# Patient Record
Sex: MALE | Race: WHITE | NOT HISPANIC OR LATINO | Employment: UNEMPLOYED | URBAN - METROPOLITAN AREA
[De-identification: names, ages, dates, MRNs, and addresses within clinical notes are randomized per-mention and may not be internally consistent; named-entity substitution may affect disease eponyms.]

---

## 2019-04-06 ENCOUNTER — HOSPITAL ENCOUNTER (INPATIENT)
Facility: HOSPITAL | Age: 69
LOS: 3 days | Discharge: HOME | DRG: 190 | End: 2019-04-09
Attending: EMERGENCY MEDICINE | Admitting: EMERGENCY MEDICINE
Payer: MEDICARE

## 2019-04-06 DIAGNOSIS — J96.01 ACUTE RESPIRATORY FAILURE WITH HYPOXIA AND HYPERCAPNIA: ICD-10-CM

## 2019-04-06 DIAGNOSIS — J96.02 ACUTE RESPIRATORY FAILURE WITH HYPOXIA AND HYPERCAPNIA: ICD-10-CM

## 2019-04-06 DIAGNOSIS — J44.1 COPD WITH ACUTE EXACERBATION: Primary | ICD-10-CM

## 2019-04-06 DIAGNOSIS — R06.02 SOB (SHORTNESS OF BREATH): ICD-10-CM

## 2019-04-06 PROCEDURE — 99291 CRITICAL CARE FIRST HOUR: CPT | Mod: 25

## 2019-04-06 PROCEDURE — 93010 EKG 12-LEAD: ICD-10-PCS | Mod: ,,, | Performed by: INTERNAL MEDICINE

## 2019-04-06 PROCEDURE — 12000002 HC ACUTE/MED SURGE SEMI-PRIVATE ROOM

## 2019-04-06 PROCEDURE — 93005 ELECTROCARDIOGRAM TRACING: CPT

## 2019-04-06 PROCEDURE — 93010 ELECTROCARDIOGRAM REPORT: CPT | Mod: ,,, | Performed by: INTERNAL MEDICINE

## 2019-04-06 RX ORDER — LISINOPRIL 40 MG/1
40 TABLET ORAL 2 TIMES DAILY
Status: ON HOLD | COMMUNITY
End: 2019-04-09 | Stop reason: HOSPADM

## 2019-04-06 RX ORDER — ALBUTEROL SULFATE 90 UG/1
2 AEROSOL, METERED RESPIRATORY (INHALATION) EVERY 6 HOURS PRN
COMMUNITY

## 2019-04-06 RX ORDER — ALBUTEROL SULFATE 90 UG/1
2 AEROSOL, METERED RESPIRATORY (INHALATION) EVERY 6 HOURS PRN
Status: ON HOLD | COMMUNITY
End: 2019-04-09 | Stop reason: HOSPADM

## 2019-04-06 RX ORDER — MELOXICAM 7.5 MG/1
7.5 TABLET ORAL 2 TIMES DAILY PRN
Status: ON HOLD | COMMUNITY
End: 2019-05-04 | Stop reason: HOSPADM

## 2019-04-06 RX ORDER — AMLODIPINE BESYLATE 10 MG/1
10 TABLET ORAL DAILY
Status: ON HOLD | COMMUNITY
End: 2019-04-09 | Stop reason: HOSPADM

## 2019-04-07 PROBLEM — J96.02 ACUTE RESPIRATORY FAILURE WITH HYPOXIA AND HYPERCAPNIA: Status: ACTIVE | Noted: 2019-04-07

## 2019-04-07 PROBLEM — J44.1 COPD WITH ACUTE EXACERBATION: Status: ACTIVE | Noted: 2019-04-07

## 2019-04-07 PROBLEM — Z72.0 TOBACCO ABUSE: Chronic | Status: ACTIVE | Noted: 2019-04-07

## 2019-04-07 PROBLEM — B18.2 CHRONIC HEPATITIS C WITHOUT HEPATIC COMA: Chronic | Status: ACTIVE | Noted: 2019-04-07

## 2019-04-07 PROBLEM — I10 ESSENTIAL HYPERTENSION: Chronic | Status: ACTIVE | Noted: 2019-04-07

## 2019-04-07 PROBLEM — J96.01 ACUTE RESPIRATORY FAILURE WITH HYPOXIA AND HYPERCAPNIA: Status: ACTIVE | Noted: 2019-04-07

## 2019-04-07 LAB
ALBUMIN SERPL BCP-MCNC: 3.9 G/DL (ref 3.5–5.2)
ALBUMIN SERPL BCP-MCNC: 4.4 G/DL (ref 3.5–5.2)
ALLENS TEST: ABNORMAL
ALP SERPL-CCNC: 73 U/L (ref 55–135)
ALP SERPL-CCNC: 79 U/L (ref 55–135)
ALT SERPL W/O P-5'-P-CCNC: 35 U/L (ref 10–44)
ALT SERPL W/O P-5'-P-CCNC: 36 U/L (ref 10–44)
ANION GAP SERPL CALC-SCNC: 8 MMOL/L (ref 8–16)
ANION GAP SERPL CALC-SCNC: 9 MMOL/L (ref 8–16)
AST SERPL-CCNC: 31 U/L (ref 10–40)
AST SERPL-CCNC: 34 U/L (ref 10–40)
BASOPHILS # BLD AUTO: 0.05 K/UL (ref 0–0.2)
BASOPHILS # BLD AUTO: 0.16 K/UL (ref 0–0.2)
BASOPHILS NFR BLD: 0.8 % (ref 0–1.9)
BASOPHILS NFR BLD: 1.6 % (ref 0–1.9)
BILIRUB SERPL-MCNC: 0.4 MG/DL (ref 0.1–1)
BILIRUB SERPL-MCNC: 0.5 MG/DL (ref 0.1–1)
BNP SERPL-MCNC: 33 PG/ML (ref 0–99)
BUN SERPL-MCNC: 18 MG/DL (ref 8–23)
BUN SERPL-MCNC: 18 MG/DL (ref 8–23)
CALCIUM SERPL-MCNC: 9.5 MG/DL (ref 8.7–10.5)
CALCIUM SERPL-MCNC: 9.7 MG/DL (ref 8.7–10.5)
CHLORIDE SERPL-SCNC: 102 MMOL/L (ref 95–110)
CHLORIDE SERPL-SCNC: 104 MMOL/L (ref 95–110)
CO2 SERPL-SCNC: 28 MMOL/L (ref 23–29)
CO2 SERPL-SCNC: 30 MMOL/L (ref 23–29)
CREAT SERPL-MCNC: 0.8 MG/DL (ref 0.5–1.4)
CREAT SERPL-MCNC: 0.9 MG/DL (ref 0.5–1.4)
DELSYS: ABNORMAL
DIFFERENTIAL METHOD: ABNORMAL
DIFFERENTIAL METHOD: ABNORMAL
EOSINOPHIL # BLD AUTO: 0.1 K/UL (ref 0–0.5)
EOSINOPHIL # BLD AUTO: 1.3 K/UL (ref 0–0.5)
EOSINOPHIL NFR BLD: 1.1 % (ref 0–8)
EOSINOPHIL NFR BLD: 12.8 % (ref 0–8)
EP: 5
ERYTHROCYTE [DISTWIDTH] IN BLOOD BY AUTOMATED COUNT: 13.5 % (ref 11.5–14.5)
ERYTHROCYTE [DISTWIDTH] IN BLOOD BY AUTOMATED COUNT: 13.6 % (ref 11.5–14.5)
ERYTHROCYTE [SEDIMENTATION RATE] IN BLOOD BY WESTERGREN METHOD: 19 MM/H
EST. GFR  (AFRICAN AMERICAN): >60 ML/MIN/1.73 M^2
EST. GFR  (AFRICAN AMERICAN): >60 ML/MIN/1.73 M^2
EST. GFR  (NON AFRICAN AMERICAN): >60 ML/MIN/1.73 M^2
EST. GFR  (NON AFRICAN AMERICAN): >60 ML/MIN/1.73 M^2
FIO2: 40
GLUCOSE SERPL-MCNC: 148 MG/DL (ref 70–110)
GLUCOSE SERPL-MCNC: 157 MG/DL (ref 70–110)
HCO3 UR-SCNC: 28.1 MMOL/L (ref 24–28)
HCT VFR BLD AUTO: 42.4 % (ref 40–54)
HCT VFR BLD AUTO: 44.6 % (ref 40–54)
HGB BLD-MCNC: 14.2 G/DL (ref 14–18)
HGB BLD-MCNC: 14.9 G/DL (ref 14–18)
IP: 10
LYMPHOCYTES # BLD AUTO: 0.6 K/UL (ref 1–4.8)
LYMPHOCYTES # BLD AUTO: 3 K/UL (ref 1–4.8)
LYMPHOCYTES NFR BLD: 30.4 % (ref 18–48)
LYMPHOCYTES NFR BLD: 9.3 % (ref 18–48)
MAGNESIUM SERPL-MCNC: 2.1 MG/DL (ref 1.6–2.6)
MAGNESIUM SERPL-MCNC: 2.1 MG/DL (ref 1.6–2.6)
MCH RBC QN AUTO: 30.4 PG (ref 27–31)
MCH RBC QN AUTO: 31.2 PG (ref 27–31)
MCHC RBC AUTO-ENTMCNC: 33.4 G/DL (ref 32–36)
MCHC RBC AUTO-ENTMCNC: 33.5 G/DL (ref 32–36)
MCV RBC AUTO: 91 FL (ref 82–98)
MCV RBC AUTO: 94 FL (ref 82–98)
MIN VOL: 8.6
MODE: ABNORMAL
MONOCYTES # BLD AUTO: 0.1 K/UL (ref 0.3–1)
MONOCYTES # BLD AUTO: 0.9 K/UL (ref 0.3–1)
MONOCYTES NFR BLD: 1.4 % (ref 4–15)
MONOCYTES NFR BLD: 8.8 % (ref 4–15)
NEUTROPHILS # BLD AUTO: 4.6 K/UL (ref 1.8–7.7)
NEUTROPHILS # BLD AUTO: 5.4 K/UL (ref 1.8–7.7)
NEUTROPHILS NFR BLD: 46.4 % (ref 38–73)
NEUTROPHILS NFR BLD: 87.1 % (ref 38–73)
PCO2 BLDA: 57.3 MMHG (ref 35–45)
PH SMN: 7.3 [PH] (ref 7.35–7.45)
PHOSPHATE SERPL-MCNC: 3.1 MG/DL (ref 2.7–4.5)
PLATELET # BLD AUTO: 186 K/UL (ref 150–350)
PLATELET # BLD AUTO: 208 K/UL (ref 150–350)
PMV BLD AUTO: 11.2 FL (ref 9.2–12.9)
PMV BLD AUTO: 11.7 FL (ref 9.2–12.9)
PO2 BLDA: 105 MMHG (ref 80–100)
POC BE: 0 MMOL/L
POC SATURATED O2: 97 % (ref 95–100)
POC TCO2: 30 MMOL/L (ref 23–27)
POTASSIUM SERPL-SCNC: 3.8 MMOL/L (ref 3.5–5.1)
POTASSIUM SERPL-SCNC: 3.9 MMOL/L (ref 3.5–5.1)
PROT SERPL-MCNC: 7 G/DL (ref 6–8.4)
PROT SERPL-MCNC: 7.3 G/DL (ref 6–8.4)
RBC # BLD AUTO: 4.67 M/UL (ref 4.6–6.2)
RBC # BLD AUTO: 4.77 M/UL (ref 4.6–6.2)
SAMPLE: ABNORMAL
SITE: ABNORMAL
SODIUM SERPL-SCNC: 139 MMOL/L (ref 136–145)
SODIUM SERPL-SCNC: 142 MMOL/L (ref 136–145)
SP02: 100
TROPONIN I SERPL DL<=0.01 NG/ML-MCNC: 0.01 NG/ML (ref 0–0.03)
WBC # BLD AUTO: 6.21 K/UL (ref 3.9–12.7)
WBC # BLD AUTO: 9.82 K/UL (ref 3.9–12.7)

## 2019-04-07 PROCEDURE — 83735 ASSAY OF MAGNESIUM: CPT

## 2019-04-07 PROCEDURE — 84484 ASSAY OF TROPONIN QUANT: CPT

## 2019-04-07 PROCEDURE — 25000003 PHARM REV CODE 250: Performed by: INTERNAL MEDICINE

## 2019-04-07 PROCEDURE — 25000003 PHARM REV CODE 250: Performed by: EMERGENCY MEDICINE

## 2019-04-07 PROCEDURE — 36415 COLL VENOUS BLD VENIPUNCTURE: CPT

## 2019-04-07 PROCEDURE — 84100 ASSAY OF PHOSPHORUS: CPT

## 2019-04-07 PROCEDURE — 94761 N-INVAS EAR/PLS OXIMETRY MLT: CPT

## 2019-04-07 PROCEDURE — G0009 ADMIN PNEUMOCOCCAL VACCINE: HCPCS | Performed by: INTERNAL MEDICINE

## 2019-04-07 PROCEDURE — 36600 WITHDRAWAL OF ARTERIAL BLOOD: CPT

## 2019-04-07 PROCEDURE — 83735 ASSAY OF MAGNESIUM: CPT | Mod: 91

## 2019-04-07 PROCEDURE — 99900035 HC TECH TIME PER 15 MIN (STAT)

## 2019-04-07 PROCEDURE — 82803 BLOOD GASES ANY COMBINATION: CPT

## 2019-04-07 PROCEDURE — 94640 AIRWAY INHALATION TREATMENT: CPT

## 2019-04-07 PROCEDURE — 27000221 HC OXYGEN, UP TO 24 HOURS

## 2019-04-07 PROCEDURE — 11000001 HC ACUTE MED/SURG PRIVATE ROOM

## 2019-04-07 PROCEDURE — 63600175 PHARM REV CODE 636 W HCPCS: Performed by: INTERNAL MEDICINE

## 2019-04-07 PROCEDURE — 25000242 PHARM REV CODE 250 ALT 637 W/ HCPCS: Performed by: EMERGENCY MEDICINE

## 2019-04-07 PROCEDURE — 90670 PCV13 VACCINE IM: CPT | Performed by: INTERNAL MEDICINE

## 2019-04-07 PROCEDURE — 85025 COMPLETE CBC W/AUTO DIFF WBC: CPT | Mod: 91

## 2019-04-07 PROCEDURE — 94660 CPAP INITIATION&MGMT: CPT

## 2019-04-07 PROCEDURE — 27000190 HC CPAP FULL FACE MASK W/VALVE

## 2019-04-07 PROCEDURE — 90471 IMMUNIZATION ADMIN: CPT | Performed by: INTERNAL MEDICINE

## 2019-04-07 PROCEDURE — 80053 COMPREHEN METABOLIC PANEL: CPT | Mod: 91

## 2019-04-07 PROCEDURE — 25000242 PHARM REV CODE 250 ALT 637 W/ HCPCS: Performed by: INTERNAL MEDICINE

## 2019-04-07 PROCEDURE — 94644 CONT INHLJ TX 1ST HOUR: CPT

## 2019-04-07 PROCEDURE — S4991 NICOTINE PATCH NONLEGEND: HCPCS | Performed by: INTERNAL MEDICINE

## 2019-04-07 PROCEDURE — 80053 COMPREHEN METABOLIC PANEL: CPT

## 2019-04-07 PROCEDURE — 83880 ASSAY OF NATRIURETIC PEPTIDE: CPT

## 2019-04-07 RX ORDER — FAMOTIDINE 20 MG/1
20 TABLET, FILM COATED ORAL 2 TIMES DAILY
Status: CANCELLED | OUTPATIENT
Start: 2019-04-07

## 2019-04-07 RX ORDER — AMOXICILLIN 250 MG
1 CAPSULE ORAL 2 TIMES DAILY PRN
Status: DISCONTINUED | OUTPATIENT
Start: 2019-04-07 | End: 2019-04-09 | Stop reason: HOSPADM

## 2019-04-07 RX ORDER — IPRATROPIUM BROMIDE AND ALBUTEROL SULFATE 2.5; .5 MG/3ML; MG/3ML
3 SOLUTION RESPIRATORY (INHALATION)
Status: DISCONTINUED | OUTPATIENT
Start: 2019-04-07 | End: 2019-04-09 | Stop reason: HOSPADM

## 2019-04-07 RX ORDER — DOXYCYCLINE HYCLATE 100 MG
100 TABLET ORAL EVERY 12 HOURS
Status: DISCONTINUED | OUTPATIENT
Start: 2019-04-07 | End: 2019-04-09 | Stop reason: HOSPADM

## 2019-04-07 RX ORDER — GUAIFENESIN 600 MG/1
600 TABLET, EXTENDED RELEASE ORAL 2 TIMES DAILY
Status: DISCONTINUED | OUTPATIENT
Start: 2019-04-07 | End: 2019-04-09 | Stop reason: HOSPADM

## 2019-04-07 RX ORDER — LISINOPRIL 20 MG/1
40 TABLET ORAL 2 TIMES DAILY
Status: DISCONTINUED | OUTPATIENT
Start: 2019-04-07 | End: 2019-04-09 | Stop reason: HOSPADM

## 2019-04-07 RX ORDER — ACETAMINOPHEN 500 MG
500 TABLET ORAL EVERY 6 HOURS PRN
Status: DISCONTINUED | OUTPATIENT
Start: 2019-04-07 | End: 2019-04-09 | Stop reason: HOSPADM

## 2019-04-07 RX ORDER — ENOXAPARIN SODIUM 100 MG/ML
40 INJECTION SUBCUTANEOUS EVERY 24 HOURS
Status: DISCONTINUED | OUTPATIENT
Start: 2019-04-07 | End: 2019-04-09 | Stop reason: HOSPADM

## 2019-04-07 RX ORDER — PANTOPRAZOLE SODIUM 40 MG/1
40 TABLET, DELAYED RELEASE ORAL DAILY
Status: DISCONTINUED | OUTPATIENT
Start: 2019-04-07 | End: 2019-04-09 | Stop reason: HOSPADM

## 2019-04-07 RX ORDER — SODIUM CHLORIDE 0.9 % (FLUSH) 0.9 %
10 SYRINGE (ML) INJECTION
Status: CANCELLED | OUTPATIENT
Start: 2019-04-07

## 2019-04-07 RX ORDER — METHYLPREDNISOLONE SOD SUCC 125 MG
80 VIAL (EA) INJECTION EVERY 8 HOURS
Status: DISCONTINUED | OUTPATIENT
Start: 2019-04-07 | End: 2019-04-09

## 2019-04-07 RX ORDER — ACETAMINOPHEN 325 MG/1
650 TABLET ORAL EVERY 6 HOURS PRN
Status: DISCONTINUED | OUTPATIENT
Start: 2019-04-07 | End: 2019-04-07 | Stop reason: SDUPTHER

## 2019-04-07 RX ORDER — CLONIDINE HYDROCHLORIDE 0.1 MG/1
0.1 TABLET ORAL 3 TIMES DAILY PRN
Status: DISCONTINUED | OUTPATIENT
Start: 2019-04-07 | End: 2019-04-09 | Stop reason: HOSPADM

## 2019-04-07 RX ORDER — IPRATROPIUM BROMIDE AND ALBUTEROL SULFATE 2.5; .5 MG/3ML; MG/3ML
3 SOLUTION RESPIRATORY (INHALATION) EVERY 4 HOURS PRN
Status: DISCONTINUED | OUTPATIENT
Start: 2019-04-07 | End: 2019-04-09 | Stop reason: HOSPADM

## 2019-04-07 RX ORDER — DOXYCYCLINE HYCLATE 100 MG
100 TABLET ORAL
Status: COMPLETED | OUTPATIENT
Start: 2019-04-07 | End: 2019-04-07

## 2019-04-07 RX ORDER — RAMELTEON 8 MG/1
8 TABLET ORAL NIGHTLY PRN
Status: DISCONTINUED | OUTPATIENT
Start: 2019-04-07 | End: 2019-04-09 | Stop reason: HOSPADM

## 2019-04-07 RX ORDER — IBUPROFEN 200 MG
1 TABLET ORAL DAILY
Status: DISCONTINUED | OUTPATIENT
Start: 2019-04-07 | End: 2019-04-09 | Stop reason: HOSPADM

## 2019-04-07 RX ORDER — ALBUTEROL SULFATE 2.5 MG/.5ML
10 SOLUTION RESPIRATORY (INHALATION) CONTINUOUS
Status: DISCONTINUED | OUTPATIENT
Start: 2019-04-07 | End: 2019-04-09 | Stop reason: HOSPADM

## 2019-04-07 RX ORDER — ONDANSETRON 2 MG/ML
8 INJECTION INTRAMUSCULAR; INTRAVENOUS EVERY 8 HOURS PRN
Status: DISCONTINUED | OUTPATIENT
Start: 2019-04-07 | End: 2019-04-09 | Stop reason: HOSPADM

## 2019-04-07 RX ORDER — AMLODIPINE BESYLATE 5 MG/1
10 TABLET ORAL DAILY
Status: DISCONTINUED | OUTPATIENT
Start: 2019-04-07 | End: 2019-04-09 | Stop reason: HOSPADM

## 2019-04-07 RX ADMIN — DOXYCYCLINE HYCLATE 100 MG: 100 TABLET, COATED ORAL at 01:04

## 2019-04-07 RX ADMIN — LISINOPRIL 40 MG: 20 TABLET ORAL at 09:04

## 2019-04-07 RX ADMIN — GUAIFENESIN 600 MG: 600 TABLET, EXTENDED RELEASE ORAL at 09:04

## 2019-04-07 RX ADMIN — GUAIFENESIN 600 MG: 600 TABLET, EXTENDED RELEASE ORAL at 08:04

## 2019-04-07 RX ADMIN — METHYLPREDNISOLONE SODIUM SUCCINATE 80 MG: 125 INJECTION, POWDER, FOR SOLUTION INTRAMUSCULAR; INTRAVENOUS at 02:04

## 2019-04-07 RX ADMIN — PNEUMOCOCCAL 13-VALENT CONJUGATE VACCINE 0.5 ML: 2.2; 2.2; 2.2; 2.2; 2.2; 4.4; 2.2; 2.2; 2.2; 2.2; 2.2; 2.2; 2.2 INJECTION, SUSPENSION INTRAMUSCULAR at 09:04

## 2019-04-07 RX ADMIN — METHYLPREDNISOLONE SODIUM SUCCINATE 80 MG: 125 INJECTION, POWDER, FOR SOLUTION INTRAMUSCULAR; INTRAVENOUS at 05:04

## 2019-04-07 RX ADMIN — METHYLPREDNISOLONE SODIUM SUCCINATE 80 MG: 125 INJECTION, POWDER, FOR SOLUTION INTRAMUSCULAR; INTRAVENOUS at 08:04

## 2019-04-07 RX ADMIN — DOXYCYCLINE HYCLATE 100 MG: 100 TABLET, COATED ORAL at 09:04

## 2019-04-07 RX ADMIN — IPRATROPIUM BROMIDE AND ALBUTEROL SULFATE 3 ML: .5; 3 SOLUTION RESPIRATORY (INHALATION) at 11:04

## 2019-04-07 RX ADMIN — LISINOPRIL 40 MG: 20 TABLET ORAL at 08:04

## 2019-04-07 RX ADMIN — IPRATROPIUM BROMIDE AND ALBUTEROL SULFATE 3 ML: .5; 3 SOLUTION RESPIRATORY (INHALATION) at 03:04

## 2019-04-07 RX ADMIN — IPRATROPIUM BROMIDE AND ALBUTEROL SULFATE 3 ML: .5; 3 SOLUTION RESPIRATORY (INHALATION) at 08:04

## 2019-04-07 RX ADMIN — RAMELTEON 8 MG: 8 TABLET, FILM COATED ORAL at 08:04

## 2019-04-07 RX ADMIN — ALBUTEROL SULFATE 10 MG: 2.5 SOLUTION RESPIRATORY (INHALATION) at 12:04

## 2019-04-07 RX ADMIN — AMLODIPINE BESYLATE 10 MG: 5 TABLET ORAL at 09:04

## 2019-04-07 RX ADMIN — DOXYCYCLINE HYCLATE 100 MG: 100 TABLET, COATED ORAL at 08:04

## 2019-04-07 RX ADMIN — PANTOPRAZOLE SODIUM 40 MG: 40 TABLET, DELAYED RELEASE ORAL at 09:04

## 2019-04-07 RX ADMIN — NICOTINE 1 PATCH: 21 PATCH, EXTENDED RELEASE TRANSDERMAL at 09:04

## 2019-04-07 RX ADMIN — IPRATROPIUM BROMIDE AND ALBUTEROL SULFATE 3 ML: .5; 3 SOLUTION RESPIRATORY (INHALATION) at 07:04

## 2019-04-07 RX ADMIN — ENOXAPARIN SODIUM 40 MG: 100 INJECTION SUBCUTANEOUS at 05:04

## 2019-04-07 NOTE — NURSING
Patient continued on BIPAP as per MD orders. No respiratory distress noted nor any use of accessory muscles noted at present. Patient denies any discomfort or pain at present. Pt orientated to hospital bed, room, and equipment. Call bell within reach at all times. Bed alarm on at all times. [facility]

## 2019-04-07 NOTE — PLAN OF CARE
Problem: Fall Injury Risk  Goal: Absence of Fall and Fall-Related Injury  Outcome: Ongoing (interventions implemented as appropriate)  Patient remains free from falls and injury. No distress noted. VSS. Questions encouraged and answered. Patient verbalized understanding. Will continue to monitor, will continue with plan of care.   Intervention: Promote Injury-Free Environment     04/07/19 1600   Optimize Balance and Safe Activity   Safety Promotion/Fall Prevention assistive device/personal item within reach;side rails raised x 2;instructed to call staff for mobility         Comments: Patient remains free from falls and injury. No distress noted. VSS. No complaint of pain, n/v, diarrhea, or SOB. Will continue to monitor, will continue with plan of care.

## 2019-04-07 NOTE — PLAN OF CARE
"SW met with patient to complete discharge needs assessment. SW reviewed with patient contents of "[facility]" including "help at home", "things patient responsible for to manage his health at home" and "preferences". Patient was able to verbalize his help at home is Naz Floyd, significant other. SW discussed with patient the things he's responsible for to manage his health at home would be by going on his doctor appointments, taking medications as prescribed, and getting prescriptions filled. SW wrote name and phone number on white communication board. Patient prefers morning appointments.    [facility]     Extended Emergency Contact Information  Primary Emergency Contact: Naz Floyd  Mobile Phone: [phone]  Relation: Significant other     Payor: [employer] MEDICARE / Plan: [employer] / Product Type: Medicare Advantage /     Preferred Pharmacy    Pt self report that Jeanna orders his medication(s) which is mailed to his home.       04/07/19 1335   Discharge Assessment   Assessment Type Discharge Planning Assessment   Confirmed/corrected address and phone number on facesheet? Yes   Assessment information obtained from? Patient   Expected Length of Stay (days) 2   Prior to hospitilization cognitive status: Alert/Oriented   Prior to hospitalization functional status: Independent   Current cognitive status: Alert/Oriented   Current Functional Status: Independent   Facility Arrived From: Ambulance via home   Lives With alone   Able to Return to Prior Arrangements yes   Is patient able to care for self after discharge? Yes   Who are your caregiver(s) and their phone number(s)? Naz Floyd [phone]   Patient's perception of discharge disposition home or selfcare   Readmission Within the Last 30 Days no previous admission in last 30 days   Patient currently being followed by outpatient case management? No   Patient currently receives any other outside agency services? No "   Equipment Currently Used at Home cane, straight   Do you have any problems affording any of your prescribed medications? No   Is the patient taking medications as prescribed? yes   Does the patient have transportation home? Yes   Transportation Anticipated family or friend will provide   Does the patient receive services at the [facility]? No   Discharge Plan A Home   DME Needed Upon Discharge  none   Patient/Family in Agreement with Plan yes        04/07/19 1335   Discharge Assessment   Assessment Type Discharge Planning Assessment   Confirmed/corrected address and phone number on facesheet? Yes   Assessment information obtained from? Patient   Expected Length of Stay (days) 2   Prior to hospitilization cognitive status: Alert/Oriented   Prior to hospitalization functional status: Independent   Current cognitive status: Alert/Oriented   Current Functional Status: Independent   Facility Arrived From: Ambulance via home   Lives With alone   Able to Return to Prior Arrangements yes   Is patient able to care for self after discharge? Yes   Who are your caregiver(s) and their phone number(s)? Naz Floyd [phone]   Patient's perception of discharge disposition home or selfcare   Readmission Within the Last 30 Days no previous admission in last 30 days   Patient currently being followed by outpatient case management? No   Patient currently receives any other outside agency services? No   Equipment Currently Used at Home cane, straight   Do you have any problems affording any of your prescribed medications? No   Is the patient taking medications as prescribed? yes   Does the patient have transportation home? Yes   Transportation Anticipated family or friend will provide   Does the patient receive services at the [facility] Clinic? No   Discharge Plan A Home   DME Needed Upon Discharge  none   Patient/Family in Agreement with Plan yes

## 2019-04-07 NOTE — ASSESSMENT & PLAN NOTE
Patient was noted to have an ambient air oxygen saturation of 80% in the field.  He likely has undiagnosed COPD given his extensive tobacco history.  He required NIPPV with BPAP upon arrival to the ED.  I have personally reviewed his chest radiograph and it was significant for hyperexpanded lung fields but without any infiltrates, consolidations, pleural effusions, nor any pneumothorax.  He did have some relief with nebulized treatments in the ED.  Will provide frequent nebulizations with CHRIS/LAMA; intravenous corticosteroids; and empiric antibiotic therapy.

## 2019-04-07 NOTE — ED PROVIDER NOTES
Encounter Date: 4/6/2019       History     Chief Complaint   Patient presents with    Shortness of Breath     pt states sob x 1 day. ems states o2 sat was 80% on r/a. started recently dx. bronchitis. arrived w/ duo neb in progress and o2 sats 100%     Patient presents with shortness of breath this been building up for 3 days.  He has a productive cough white sputum.  No hemoptysis.  States he has a history of wheezing in the past so she with bronchitis.  He was most recently treated 1 month ago with antibiotics and steroids.  He had some improvement in his symptoms and felt like he never completely got better.  He states that he feels like he is relapse over last 3 days.  His coworkers noticed that he was wheezing today at work.  He became more short of breath tonight.  EMS was called and when they arrived his oxygen saturation was 80% on room air.  He was severely wheezing.  He was given a DuoNeb and Solu-Medrol 125 mg IV.  Oxygen saturations improved on supplemental oxygen.  Patient is still short of breath. He also complains of chest heaviness for 2 days.  This is constant.  He denies any swelling in his legs or calf pain. Denies fever.  Denies abdominal pain.  Denies back pain.  Denies nausea, vomiting, or diarrhea.        Review of patient's allergies indicates:  No Known Allergies  Past Medical History:   Diagnosis Date    Alcohol abuse     Chronic bronchitis     Hepatitis C     Hypertension      History reviewed. No pertinent surgical history.  History reviewed. No pertinent family history.  Social History     Tobacco Use    Smoking status: Current Every Day Smoker     Packs/day: 0.50     Types: Cigarettes    Smokeless tobacco: Never Used   Substance Use Topics    Alcohol use: Not Currently     Comment: used to be a heavy drinker    Drug use: Not Currently     Review of Systems   Constitutional: Negative for chills, diaphoresis and fever.   HENT: Negative for congestion, sinus pain and sore throat.     Eyes: Negative.    Respiratory: Positive for cough, chest tightness, shortness of breath and wheezing.    Cardiovascular: Positive for chest pain. Negative for palpitations and leg swelling.   Gastrointestinal: Negative for abdominal pain, diarrhea, nausea and vomiting.        NO melena or rectal bleeding   Genitourinary: Negative for dysuria and flank pain.   Musculoskeletal: Negative for back pain.   Skin: Negative for rash and wound.   Neurological: Negative for dizziness, syncope, speech difficulty, weakness, numbness and headaches.        No numbness   Psychiatric/Behavioral: Negative for confusion.   All other systems reviewed and are negative.      Physical Exam     Initial Vitals [04/06/19 2349]   BP Pulse Resp Temp SpO2   (!) 180/105 86 (!) 28 97.8 °F (36.6 °C) 100 %      MAP       --         Physical Exam    Nursing note and vitals reviewed.  Constitutional: He appears well-developed and well-nourished. He is not diaphoretic. He appears distressed.   Patient appears uncomfortable due to shortness of breath.   HENT:   Head: Normocephalic and atraumatic.   Nose: Nose normal.   Mouth/Throat: Oropharynx is clear and moist.   Eyes: Conjunctivae and EOM are normal. Pupils are equal, round, and reactive to light. Right eye exhibits no discharge. Left eye exhibits no discharge. No scleral icterus.   Neck: Neck supple. No tracheal deviation present.   Cardiovascular: Normal rate, regular rhythm and normal heart sounds.   No murmur heard.  Pulmonary/Chest: No stridor. He is in respiratory distress. He has wheezes. He has rhonchi. He has no rales. He exhibits no tenderness.   Abdominal: Soft. He exhibits no distension. There is no tenderness. There is no rebound and no guarding.   Musculoskeletal: Normal range of motion. He exhibits no edema or tenderness.   Neurological: He is alert and oriented to person, place, and time. He has normal strength. No cranial nerve deficit. GCS score is 15. GCS eye subscore is 4.  GCS verbal subscore is 5. GCS motor subscore is 6.   Skin: Skin is warm and dry. No rash noted.   Psychiatric: His behavior is normal. Judgment and thought content normal.   Patient is anxious.         ED Course   Critical Care  Date/Time: 4/7/2019 1:43 AM  Performed by: Main Velasquez MD  Authorized by: Main Velasquez MD   Direct patient critical care time: 10 minutes  Additional history critical care time: 10 minutes  Ordering / reviewing critical care time: 10 minutes  Documentation critical care time: 10 minutes  Total critical care time (exclusive of procedural time) : 40 minutes  Critical care was necessary to treat or prevent imminent or life-threatening deterioration of the following conditions: respiratory failure.  Critical care was time spent personally by me on the following activities: re-evaluation of patient's condition, ordering and review of laboratory studies, pulse oximetry, ordering and performing treatments and interventions, evaluation of patient's response to treatment, review of old charts and ordering and review of radiographic studies.        Labs Reviewed   CBC W/ AUTO DIFFERENTIAL   COMPREHENSIVE METABOLIC PANEL   TROPONIN I   MAGNESIUM   B-TYPE NATRIURETIC PEPTIDE     EKG Readings: (Independently Interpreted)   Initial Reading: No STEMI. Rhythm: Normal Sinus Rhythm. Heart Rate: 86. Ectopy: No Ectopy. Conduction: Normal. ST Segments: Normal ST Segments. T Waves: Normal. Axis: Normal. Q Waves: V1 and V2.       Imaging Results    None          Medical Decision Making:   Differential Diagnosis:   COPD, pneumonia, respiratory failure, CHF.  Clinical Tests:   Lab Tests: Reviewed  The following lab test(s) were unremarkable: CBC, CMP, BNP and Troponin  Radiological Study: Reviewed  Medical Tests: Reviewed  ED Management:  Symptoms consistent with COPD exacerbation.  Symptoms improved with BiPAP and albuterol.  Will continue BiPAP for the remainder of the night now the patient is more  comfortable.  Will continue steroids in doing nebs.  Discussed results with the patient and family.                      Clinical Impression:       ICD-10-CM ICD-9-CM   1. COPD with acute exacerbation J44.1 491.21   2. SOB (shortness of breath) R06.02 786.05   3. Acute respiratory failure with hypoxia and hypercapnia J96.01 518.81    J96.02          Disposition:   Disposition: Admitted  Condition: Stable                        Main Velasquez MD  04/07/19 0144

## 2019-04-07 NOTE — ASSESSMENT & PLAN NOTE
Patient's blood pressure is well-controlled; will continue home regimen of amlodipine and lisinopril, and provide as-needed clonidine.

## 2019-04-07 NOTE — CARE UPDATE
Patient seen and examined.  Agree with Dr. Mancera's treatment and plan.  Acute respiratory failure.    Probable COPD with acute exacerbation.  Started on nebs, oxygen, IV steroids and Doxy.  Continue current management and reassess in Am.

## 2019-04-07 NOTE — SUBJECTIVE & OBJECTIVE
Past Medical History:   Diagnosis Date    Alcohol abuse     Chronic bronchitis     Hepatitis C     Hypertension        History reviewed. No pertinent surgical history.    Review of patient's allergies indicates:  No Known Allergies    No current facility-administered medications on file prior to encounter.      Current Outpatient Medications on File Prior to Encounter   Medication Sig    albuterol (PROAIR HFA) 90 mcg/actuation inhaler Inhale 2 puffs into the lungs every 6 (six) hours as needed for Wheezing. Rescue    albuterol (VENTOLIN HFA) 90 mcg/actuation inhaler Inhale 2 puffs into the lungs every 6 (six) hours as needed for Wheezing. Rescue    amLODIPine (NORVASC) 10 MG tablet Take 10 mg by mouth once daily.    lisinopril (PRINIVIL,ZESTRIL) 40 MG tablet Take 40 mg by mouth 2 (two) times daily.    meloxicam (MOBIC) 7.5 MG tablet Take 7.5 mg by mouth 2 (two) times daily as needed for Pain.     Family History     None        Tobacco Use    Smoking status: Current Every Day Smoker     Packs/day: 0.50     Types: Cigarettes    Smokeless tobacco: Never Used   Substance and Sexual Activity    Alcohol use: Not Currently     Comment: used to be a heavy drinker    Drug use: Not Currently    Sexual activity: Not on file     Review of Systems   Constitutional: Negative for activity change, appetite change, chills, diaphoresis, fatigue, fever and unexpected weight change.   HENT: Negative.    Eyes: Negative.    Respiratory: Positive for cough, shortness of breath and wheezing. Negative for chest tightness.    Cardiovascular: Negative for chest pain, palpitations and leg swelling.   Gastrointestinal: Negative for abdominal distention, abdominal pain, blood in stool, constipation, diarrhea, nausea and vomiting.   Genitourinary: Negative for dysuria and hematuria.   Musculoskeletal: Negative.    Skin: Negative.    Neurological: Negative for dizziness, seizures, syncope, weakness and light-headedness.    Psychiatric/Behavioral: Negative.      Objective:     Vital Signs (Most Recent):  Temp: 98.1 °F (36.7 °C) (04/07/19 0257)  Pulse: 89 (04/07/19 0257)  Resp: 19 (04/07/19 0316)  BP: (!) 146/80 (04/07/19 0257)  SpO2: 100 % (04/07/19 0415) Vital Signs (24h Range):  Temp:  [97.7 °F (36.5 °C)-98.1 °F (36.7 °C)] 98.1 °F (36.7 °C)  Pulse:  [65-89] 89  Resp:  [16-28] 19  SpO2:  [100 %] 100 %  BP: (126-180)/() 146/80     Weight: 53.3 kg (117 lb 8 oz)  Body mass index is 19.55 kg/m².    Physical Exam   Constitutional: He is oriented to person, place, and time. He appears well-developed and well-nourished. No distress.   HENT:   Head: Normocephalic and atraumatic.   Right Ear: External ear normal.   Left Ear: External ear normal.   Nose: Nose normal.   BPAP mask in place   Eyes: Right eye exhibits no discharge.   Cardiovascular: Normal rate, regular rhythm, normal heart sounds and intact distal pulses. Exam reveals no gallop and no friction rub.   No murmur heard.  Pulmonary/Chest:   On BPAP with mildly increased respiratory effort and in expiratory wheezing bilaterally; there are no crackles.   Abdominal: Soft. Bowel sounds are normal. He exhibits no distension. There is no tenderness. There is no rebound and no guarding.   Musculoskeletal: Normal range of motion. He exhibits no edema.   Neurological: He is alert and oriented to person, place, and time.   Skin: Skin is warm and dry. He is not diaphoretic. No erythema.   Psychiatric: He has a normal mood and affect. His behavior is normal. Thought content normal.   Nursing note and vitals reviewed.          Significant Labs: All pertinent labs within the past 24 hours have been reviewed.    Significant Imaging: I have reviewed and interpreted all pertinent imaging results/findings within the past 24 hours.

## 2019-04-07 NOTE — HPI
Mr. Anthony Membreno is a 68 y.o. male with essential hypertension, chronic hepatitis C, and tobacco abuse who presents to [facility] ED with complaints of dyspnea for the past 3 days.  Dyspnea is mainly on exertion but in order to breathe at rest he had to direct a fan towards face.  The dyspnea is associated with wheezing and a cough that is productive of white expectorant.  He has pain across his chest only on coughing.  He denies any fevers, chills, nausea, vomiting, hemoptysis, nor any lower extremity pain or swelling. He has not had any recent travel nor sick contacts.  He does continue to smoke but is down to 2 cigarettes a day.  He has been smoking for the past 50 years and was smoking 1.5 packs a day at his worst.  He has never been diagnosed with COPD but often has bronchitis.  He otherwise has been in his usual state of health.

## 2019-04-07 NOTE — H&P
[facility] Medicine  History & Physical    Patient Name: Anthony Membreno  MRN: [id number]  Admission Date: 4/6/2019  Attending Physician: Kyle Boss MD   Primary Care Provider: Primary Doctor No         Patient information was obtained from patient.     Subjective:     Principal Problem:COPD with acute exacerbation    Chief Complaint:  Shortness of breath for 3 days    HPI: Mr. Anthony Membreno is a 68 y.o. male with essential hypertension, chronic hepatitis C, and tobacco abuse who presents to [facility] ED with complaints of dyspnea for the past 3 days.  Dyspnea is mainly on exertion but in order to breathe at rest he had to direct a fan towards face.  The dyspnea is associated with wheezing and a cough that is productive of white expectorant.  He has pain across his chest only on coughing.  He denies any fevers, chills, nausea, vomiting, hemoptysis, nor any lower extremity pain or swelling. He has not had any recent travel nor sick contacts.  He does continue to smoke but is down to 2 cigarettes a day.  He has been smoking for the past 50 years and was smoking 1.5 packs a day at his worst.  He has never been diagnosed with COPD but often has bronchitis.  He otherwise has been in his usual state of health.    Chart Review:  Patient has not had any recent hospitalizations or outpatient clinic visits within the system.    Past Medical History:   Diagnosis Date    Alcohol abuse     Chronic bronchitis     Hepatitis C     Hypertension        History reviewed. No pertinent surgical history.    Review of patient's allergies indicates:  No Known Allergies    No current facility-administered medications on file prior to encounter.      Current Outpatient Medications on File Prior to Encounter   Medication Sig    albuterol (PROAIR HFA) 90 mcg/actuation inhaler Inhale 2 puffs into the lungs every 6 (six) hours as needed for Wheezing. Rescue    albuterol (VENTOLIN HFA) 90 mcg/actuation inhaler  Inhale 2 puffs into the lungs every 6 (six) hours as needed for Wheezing. Rescue    amLODIPine (NORVASC) 10 MG tablet Take 10 mg by mouth once daily.    lisinopril (PRINIVIL,ZESTRIL) 40 MG tablet Take 40 mg by mouth 2 (two) times daily.    meloxicam (MOBIC) 7.5 MG tablet Take 7.5 mg by mouth 2 (two) times daily as needed for Pain.     Family History     None        Tobacco Use    Smoking status: Current Every Day Smoker     Packs/day: 0.50     Types: Cigarettes    Smokeless tobacco: Never Used   Substance and Sexual Activity    Alcohol use: Not Currently     Comment: used to be a heavy drinker    Drug use: Not Currently    Sexual activity: Not on file     Review of Systems   Constitutional: Negative for activity change, appetite change, chills, diaphoresis, fatigue, fever and unexpected weight change.   HENT: Negative.    Eyes: Negative.    Respiratory: Positive for cough, shortness of breath and wheezing. Negative for chest tightness.    Cardiovascular: Negative for chest pain, palpitations and leg swelling.   Gastrointestinal: Negative for abdominal distention, abdominal pain, blood in stool, constipation, diarrhea, nausea and vomiting.   Genitourinary: Negative for dysuria and hematuria.   Musculoskeletal: Negative.    Skin: Negative.    Neurological: Negative for dizziness, seizures, syncope, weakness and light-headedness.   Psychiatric/Behavioral: Negative.      Objective:     Vital Signs (Most Recent):  Temp: 98.1 °F (36.7 °C) (04/07/19 0257)  Pulse: 89 (04/07/19 0257)  Resp: 19 (04/07/19 0316)  BP: (!) 146/80 (04/07/19 0257)  SpO2: 100 % (04/07/19 0415) Vital Signs (24h Range):  Temp:  [97.7 °F (36.5 °C)-98.1 °F (36.7 °C)] 98.1 °F (36.7 °C)  Pulse:  [65-89] 89  Resp:  [16-28] 19  SpO2:  [100 %] 100 %  BP: (126-180)/() 146/80     Weight: 53.3 kg (117 lb 8 oz)  Body mass index is 19.55 kg/m².    Physical Exam   Constitutional: He is oriented to person, place, and time. He appears well-developed  and well-nourished. No distress.   HENT:   Head: Normocephalic and atraumatic.   Right Ear: External ear normal.   Left Ear: External ear normal.   Nose: Nose normal.   BPAP mask in place   Eyes: Right eye exhibits no discharge.   Cardiovascular: Normal rate, regular rhythm, normal heart sounds and intact distal pulses. Exam reveals no gallop and no friction rub.   No murmur heard.  Pulmonary/Chest:   On BPAP with mildly increased respiratory effort and in expiratory wheezing bilaterally; there are no crackles.   Abdominal: Soft. Bowel sounds are normal. He exhibits no distension. There is no tenderness. There is no rebound and no guarding.   Musculoskeletal: Normal range of motion. He exhibits no edema.   Neurological: He is alert and oriented to person, place, and time.   Skin: Skin is warm and dry. He is not diaphoretic. No erythema.   Psychiatric: He has a normal mood and affect. His behavior is normal. Thought content normal.   Nursing note and vitals reviewed.          Significant Labs: All pertinent labs within the past 24 hours have been reviewed.    Significant Imaging: I have reviewed and interpreted all pertinent imaging results/findings within the past 24 hours.    Assessment/Plan:     * COPD with acute exacerbation  Patient was noted to have an ambient air oxygen saturation of 80% in the field.  He likely has undiagnosed COPD given his extensive tobacco history.  He required NIPPV with BPAP upon arrival to the ED.  I have personally reviewed his chest radiograph and it was significant for hyperexpanded lung fields but without any infiltrates, consolidations, pleural effusions, nor any pneumothorax.  He did have some relief with nebulized treatments in the ED.  Will provide frequent nebulizations with CHRIS/LAMA; intravenous corticosteroids; and empiric antibiotic therapy.    Acute respiratory failure with hypoxia and hypercapnia  As addressed above.    Essential hypertension  Patient's blood pressure is  well-controlled; will continue home regimen of amlodipine and lisinopril, and provide as-needed clonidine.    Chronic hepatitis C without hepatic coma  Stable; there are no acute issues.    Tobacco abuse  Patient was counseled on smoking cessation and he will be provided a nicotine transdermal patch applied while inpatient.  Will provide additional smoking cessation counseling prior to discharge.    VTE Risk Mitigation (From admission, onward)        Ordered     enoxaparin injection 40 mg  Daily      04/07/19 0321     IP VTE HIGH RISK PATIENT  Once      04/07/19 0321           Miladys Mancera M.D.  Staff Nocturnist  Department of Hospital Medicine  [facility]  Pager: [phone]          N.B.: Portions of this note was dictated using M*Modal Fluency Direct--there may be phonetic errors occasionally missed on review.

## 2019-04-07 NOTE — ED TRIAGE NOTES
Pt reports to ED via EMS from home with c/o SOB, wheezing, productive cough with thick white sputum, & chest tightness starting earlier this morning; pt has hx of chronic bronchitis and reports he was recently diagnosed; upon EMS arrival pt's SpO2 was 80% RA; pt was placed on duoneb/albuterol treatment and quickly increased to 100%; pt also given 125mg Solumedrol IV; upon arrival to ED pt still appears to have labored breathing with accessory muscle use and retractions; pt currently 100% on duoneb treatment but is still tachyepic with RR around 22-26; pt reports using his 2 inhalers with no relief; pt AAOx4

## 2019-04-08 PROCEDURE — 11000001 HC ACUTE MED/SURG PRIVATE ROOM

## 2019-04-08 PROCEDURE — S4991 NICOTINE PATCH NONLEGEND: HCPCS | Performed by: INTERNAL MEDICINE

## 2019-04-08 PROCEDURE — 27000221 HC OXYGEN, UP TO 24 HOURS

## 2019-04-08 PROCEDURE — 25000003 PHARM REV CODE 250: Performed by: EMERGENCY MEDICINE

## 2019-04-08 PROCEDURE — 25000242 PHARM REV CODE 250 ALT 637 W/ HCPCS: Performed by: INTERNAL MEDICINE

## 2019-04-08 PROCEDURE — 25000003 PHARM REV CODE 250: Performed by: INTERNAL MEDICINE

## 2019-04-08 PROCEDURE — 63600175 PHARM REV CODE 636 W HCPCS: Performed by: INTERNAL MEDICINE

## 2019-04-08 PROCEDURE — 25000003 PHARM REV CODE 250: Performed by: HOSPITALIST

## 2019-04-08 PROCEDURE — 94761 N-INVAS EAR/PLS OXIMETRY MLT: CPT

## 2019-04-08 PROCEDURE — 94640 AIRWAY INHALATION TREATMENT: CPT

## 2019-04-08 RX ORDER — OXYMETAZOLINE HCL 0.05 %
2 SPRAY, NON-AEROSOL (ML) NASAL 2 TIMES DAILY
Status: DISCONTINUED | OUTPATIENT
Start: 2019-04-08 | End: 2019-04-09 | Stop reason: HOSPADM

## 2019-04-08 RX ADMIN — DOXYCYCLINE HYCLATE 100 MG: 100 TABLET, COATED ORAL at 10:04

## 2019-04-08 RX ADMIN — METHYLPREDNISOLONE SODIUM SUCCINATE 80 MG: 125 INJECTION, POWDER, FOR SOLUTION INTRAMUSCULAR; INTRAVENOUS at 03:04

## 2019-04-08 RX ADMIN — OXYMETAZOLINE HYDROCHLORIDE 2 SPRAY: 5 SPRAY NASAL at 09:04

## 2019-04-08 RX ADMIN — ENOXAPARIN SODIUM 40 MG: 100 INJECTION SUBCUTANEOUS at 05:04

## 2019-04-08 RX ADMIN — IPRATROPIUM BROMIDE AND ALBUTEROL SULFATE 3 ML: .5; 3 SOLUTION RESPIRATORY (INHALATION) at 08:04

## 2019-04-08 RX ADMIN — LISINOPRIL 40 MG: 20 TABLET ORAL at 10:04

## 2019-04-08 RX ADMIN — GUAIFENESIN 600 MG: 600 TABLET, EXTENDED RELEASE ORAL at 09:04

## 2019-04-08 RX ADMIN — AMLODIPINE BESYLATE 10 MG: 5 TABLET ORAL at 10:04

## 2019-04-08 RX ADMIN — IPRATROPIUM BROMIDE AND ALBUTEROL SULFATE 3 ML: .5; 3 SOLUTION RESPIRATORY (INHALATION) at 04:04

## 2019-04-08 RX ADMIN — IPRATROPIUM BROMIDE AND ALBUTEROL SULFATE 3 ML: .5; 3 SOLUTION RESPIRATORY (INHALATION) at 11:04

## 2019-04-08 RX ADMIN — IPRATROPIUM BROMIDE AND ALBUTEROL SULFATE 3 ML: .5; 3 SOLUTION RESPIRATORY (INHALATION) at 07:04

## 2019-04-08 RX ADMIN — GUAIFENESIN 600 MG: 600 TABLET, EXTENDED RELEASE ORAL at 10:04

## 2019-04-08 RX ADMIN — PANTOPRAZOLE SODIUM 40 MG: 40 TABLET, DELAYED RELEASE ORAL at 10:04

## 2019-04-08 RX ADMIN — LISINOPRIL 40 MG: 20 TABLET ORAL at 09:04

## 2019-04-08 RX ADMIN — METHYLPREDNISOLONE SODIUM SUCCINATE 80 MG: 125 INJECTION, POWDER, FOR SOLUTION INTRAMUSCULAR; INTRAVENOUS at 10:04

## 2019-04-08 RX ADMIN — NICOTINE 1 PATCH: 21 PATCH, EXTENDED RELEASE TRANSDERMAL at 10:04

## 2019-04-08 RX ADMIN — OXYMETAZOLINE HYDROCHLORIDE 2 SPRAY: 5 SPRAY NASAL at 10:04

## 2019-04-08 RX ADMIN — METHYLPREDNISOLONE SODIUM SUCCINATE 80 MG: 125 INJECTION, POWDER, FOR SOLUTION INTRAMUSCULAR; INTRAVENOUS at 05:04

## 2019-04-08 RX ADMIN — DOXYCYCLINE HYCLATE 100 MG: 100 TABLET, COATED ORAL at 09:04

## 2019-04-08 NOTE — NURSING
Patient had uneventful night. Tolerated O2 via NC and BIPAP without difficulties.   Patient does continue to admit to a productive cough with thick yellow sputum. No c/o pain at present nor any c/o discomfort. Call bell within reach. Will continue to montior.

## 2019-04-08 NOTE — PROGRESS NOTES
[facility] Hospital Medicine  Progress Note    Patient Name: Anthony Membreno  MRN: [id number]  Patient Class: IP- Inpatient   Admission Date: 4/6/2019  Length of Stay: 1 days  Attending Physician: Kyle Boss MD  Primary Care Provider: [facility]        Subjective:     Principal Problem:COPD with acute exacerbation    HPI:  Mr. Anthony Membreno is a 68 y.o. male with essential hypertension, chronic hepatitis C, and tobacco abuse who presents to [facility] ED with complaints of dyspnea for the past 3 days.  Dyspnea is mainly on exertion but in order to breathe at rest he had to direct a fan towards face.  The dyspnea is associated with wheezing and a cough that is productive of white expectorant.  He has pain across his chest only on coughing.  He denies any fevers, chills, nausea, vomiting, hemoptysis, nor any lower extremity pain or swelling. He has not had any recent travel nor sick contacts.  He does continue to smoke but is down to 2 cigarettes a day.  He has been smoking for the past 50 years and was smoking 1.5 packs a day at his worst.  He has never been diagnosed with COPD but often has bronchitis.  He otherwise has been in his usual state of health.    Hospital Course:  69 y/o male long time smoker with probable undiagnosed COPD presented with shortness of breath.  Probable COPD exacerbation and started on nebs, oxygen, steroids and Doxy.    Interval History: Feeling better, but breathing not at baseline.    Review of Systems   HENT: Negative for ear discharge and ear pain.    Eyes: Negative for pain and itching.   Endocrine: Negative for polyphagia and polyuria.   Neurological: Negative for seizures and syncope.     Objective:     Vital Signs (Most Recent):  Temp: 98 °F (36.7 °C) (04/08/19 1138)  Pulse: 80 (04/08/19 1159)  Resp: 20 (04/08/19 1159)  BP: (!) 157/72 (04/08/19 1138)  SpO2: (!) 92 % (04/08/19 1159) Vital Signs (24h Range):  Temp:  [97.6 °F (36.4 °C)-98.1 °F (36.7 °C)] 98  °F (36.7 °C)  Pulse:  [75-96] 80  Resp:  [16-20] 20  SpO2:  [91 %-97 %] 92 %  BP: (126-157)/(70-86) 157/72     Weight: 51.8 kg (114 lb 1.6 oz)  Body mass index is 18.99 kg/m².    Intake/Output Summary (Last 24 hours) at 4/8/2019 1509  Last data filed at 4/8/2019 0600  Gross per 24 hour   Intake 720 ml   Output 1100 ml   Net -380 ml      Physical Exam   Constitutional: He is oriented to person, place, and time. He appears well-developed and well-nourished. No distress.   HENT:   Head: Normocephalic and atraumatic.   Right Ear: External ear normal.   Left Ear: External ear normal.   Nose: Nose normal.   Eyes: Right eye exhibits no discharge.   Cardiovascular: Normal rate, regular rhythm, normal heart sounds and intact distal pulses. Exam reveals no gallop and no friction rub.   No murmur heard.  Pulmonary/Chest: Effort normal.   Decreased bilateral expiratory wheezing   Abdominal: Soft. Bowel sounds are normal. He exhibits no distension. There is no tenderness. There is no rebound and no guarding.   Musculoskeletal: Normal range of motion. He exhibits no edema.   Neurological: He is alert and oriented to person, place, and time.   Skin: Skin is warm and dry. He is not diaphoretic. No erythema.   Psychiatric: He has a normal mood and affect. His behavior is normal. Thought content normal.   Nursing note and vitals reviewed.      Significant Labs:   BMP:   Recent Labs   Lab 04/07/19  0506   *      K 3.8      CO2 28   BUN 18   CREATININE 0.8   CALCIUM 9.5   MG 2.1     CBC:   Recent Labs   Lab 04/07/19  0010 04/07/19  0506   WBC 9.82 6.21   HGB 14.9 14.2   HCT 44.6 42.4    186     Assessment/Plan:      * COPD with acute exacerbation  Patient was noted to have an ambient air oxygen saturation of 80% in the field.  He likely has undiagnosed COPD given his extensive tobacco history.  He required NIPPV with BPAP upon arrival to the ED.  CXR was significant for hyperexpanded lung fields but without  any infiltrates, consolidations, pleural effusions, nor any pneumothorax.    He did have some relief with nebulized treatments in the ED.  Will provide frequent nebulizations with CHRIS/LAMA; intravenous corticosteroids; and empiric antibiotic therapy.  Improving, but not at baseline.  Probably one more day of treatment and possibly home tomorrow.    Tobacco abuse  Patient was counseled on smoking cessation and he will be provided a nicotine transdermal patch applied while inpatient.  Will provide additional smoking cessation counseling prior to discharge.    Chronic hepatitis C without hepatic coma  Stable; there are no acute issues.    Essential hypertension  Patient's blood pressure is well-controlled; will continue home regimen of amlodipine and lisinopril, and provide as-needed clonidine.    Acute respiratory failure with hypoxia and hypercapnia  As addressed above.      VTE Risk Mitigation (From admission, onward)        Ordered     enoxaparin injection 40 mg  Daily      04/07/19 0321     IP VTE HIGH RISK PATIENT  Once      04/07/19 0321              Kyle Boss MD  Department of Hospital Medicine   [facility]

## 2019-04-08 NOTE — SUBJECTIVE & OBJECTIVE
Interval History: Feeling better, but breathing not at baseline.    Review of Systems   HENT: Negative for ear discharge and ear pain.    Eyes: Negative for pain and itching.   Endocrine: Negative for polyphagia and polyuria.   Neurological: Negative for seizures and syncope.     Objective:     Vital Signs (Most Recent):  Temp: 98 °F (36.7 °C) (04/08/19 1138)  Pulse: 80 (04/08/19 1159)  Resp: 20 (04/08/19 1159)  BP: (!) 157/72 (04/08/19 1138)  SpO2: (!) 92 % (04/08/19 1159) Vital Signs (24h Range):  Temp:  [97.6 °F (36.4 °C)-98.1 °F (36.7 °C)] 98 °F (36.7 °C)  Pulse:  [75-96] 80  Resp:  [16-20] 20  SpO2:  [91 %-97 %] 92 %  BP: (126-157)/(70-86) 157/72     Weight: 51.8 kg (114 lb 1.6 oz)  Body mass index is 18.99 kg/m².    Intake/Output Summary (Last 24 hours) at 4/8/2019 1509  Last data filed at 4/8/2019 0600  Gross per 24 hour   Intake 720 ml   Output 1100 ml   Net -380 ml      Physical Exam   Constitutional: He is oriented to person, place, and time. He appears well-developed and well-nourished. No distress.   HENT:   Head: Normocephalic and atraumatic.   Right Ear: External ear normal.   Left Ear: External ear normal.   Nose: Nose normal.   Eyes: Right eye exhibits no discharge.   Cardiovascular: Normal rate, regular rhythm, normal heart sounds and intact distal pulses. Exam reveals no gallop and no friction rub.   No murmur heard.  Pulmonary/Chest: Effort normal.   Decreased bilateral expiratory wheezing   Abdominal: Soft. Bowel sounds are normal. He exhibits no distension. There is no tenderness. There is no rebound and no guarding.   Musculoskeletal: Normal range of motion. He exhibits no edema.   Neurological: He is alert and oriented to person, place, and time.   Skin: Skin is warm and dry. He is not diaphoretic. No erythema.   Psychiatric: He has a normal mood and affect. His behavior is normal. Thought content normal.   Nursing note and vitals reviewed.      Significant Labs:   BMP:   Recent Labs   Lab  04/07/19  0506   *      K 3.8      CO2 28   BUN 18   CREATININE 0.8   CALCIUM 9.5   MG 2.1     CBC:   Recent Labs   Lab 04/07/19  0010 04/07/19  0506   WBC 9.82 6.21   HGB 14.9 14.2   HCT 44.6 42.4    186

## 2019-04-08 NOTE — NURSING
Patient's oxygen was 93% on room air at rest.  Patient's oxygen was 89% on room air while ambulating around the nurses' station

## 2019-04-08 NOTE — HOSPITAL COURSE
67 y/o male long time smoker with probable undiagnosed COPD presented with shortness of breath.  Probable COPD exacerbation and started on nebs, oxygen, steroids and Doxy.    Pt will continue with home oxygen and, prednisone and antibiotic. Follow up PCP as scheduled.

## 2019-04-09 VITALS
DIASTOLIC BLOOD PRESSURE: 75 MMHG | WEIGHT: 115.44 LBS | BODY MASS INDEX: 19.23 KG/M2 | HEIGHT: 65 IN | TEMPERATURE: 98 F | SYSTOLIC BLOOD PRESSURE: 120 MMHG | HEART RATE: 91 BPM | OXYGEN SATURATION: 93 % | RESPIRATION RATE: 20 BRPM

## 2019-04-09 PROCEDURE — 25000003 PHARM REV CODE 250: Performed by: EMERGENCY MEDICINE

## 2019-04-09 PROCEDURE — 25000242 PHARM REV CODE 250 ALT 637 W/ HCPCS: Performed by: INTERNAL MEDICINE

## 2019-04-09 PROCEDURE — 63600175 PHARM REV CODE 636 W HCPCS: Performed by: INTERNAL MEDICINE

## 2019-04-09 PROCEDURE — 94761 N-INVAS EAR/PLS OXIMETRY MLT: CPT

## 2019-04-09 PROCEDURE — 94640 AIRWAY INHALATION TREATMENT: CPT

## 2019-04-09 PROCEDURE — 27000221 HC OXYGEN, UP TO 24 HOURS

## 2019-04-09 PROCEDURE — 25000003 PHARM REV CODE 250: Performed by: INTERNAL MEDICINE

## 2019-04-09 PROCEDURE — 25000242 PHARM REV CODE 250 ALT 637 W/ HCPCS: Performed by: HOSPITALIST

## 2019-04-09 PROCEDURE — S4991 NICOTINE PATCH NONLEGEND: HCPCS | Performed by: INTERNAL MEDICINE

## 2019-04-09 RX ORDER — IPRATROPIUM BROMIDE AND ALBUTEROL SULFATE 2.5; .5 MG/3ML; MG/3ML
3 SOLUTION RESPIRATORY (INHALATION) EVERY 4 HOURS PRN
Qty: 1 BOX | Refills: 3 | Status: SHIPPED | OUTPATIENT
Start: 2019-04-09 | End: 2020-04-08

## 2019-04-09 RX ORDER — FLUTICASONE FUROATE AND VILANTEROL 100; 25 UG/1; UG/1
1 POWDER RESPIRATORY (INHALATION) DAILY
Qty: 60 EACH | Refills: 3 | Status: SHIPPED | OUTPATIENT
Start: 2019-04-09

## 2019-04-09 RX ORDER — FLUTICASONE FUROATE AND VILANTEROL 100; 25 UG/1; UG/1
1 POWDER RESPIRATORY (INHALATION) DAILY
Status: DISCONTINUED | OUTPATIENT
Start: 2019-04-09 | End: 2019-04-09 | Stop reason: HOSPADM

## 2019-04-09 RX ORDER — GUAIFENESIN 600 MG/1
600 TABLET, EXTENDED RELEASE ORAL 2 TIMES DAILY
COMMUNITY
Start: 2019-04-09

## 2019-04-09 RX ORDER — PREDNISONE 20 MG/1
40 TABLET ORAL DAILY
Status: DISCONTINUED | OUTPATIENT
Start: 2019-04-09 | End: 2019-04-09 | Stop reason: HOSPADM

## 2019-04-09 RX ORDER — IBUPROFEN 200 MG
1 TABLET ORAL DAILY
Refills: 0 | COMMUNITY
Start: 2019-04-10

## 2019-04-09 RX ORDER — DOXYCYCLINE HYCLATE 100 MG
100 TABLET ORAL EVERY 12 HOURS
Qty: 10 TABLET | Refills: 0 | Status: SHIPPED | OUTPATIENT
Start: 2019-04-09 | End: 2019-04-14

## 2019-04-09 RX ORDER — PREDNISONE 20 MG/1
40 TABLET ORAL DAILY
Qty: 10 TABLET | Refills: 0 | Status: SHIPPED | OUTPATIENT
Start: 2019-04-09 | End: 2019-04-14

## 2019-04-09 RX ADMIN — NICOTINE 1 PATCH: 21 PATCH, EXTENDED RELEASE TRANSDERMAL at 10:04

## 2019-04-09 RX ADMIN — DOCUSATE SODIUM AND SENNOSIDES 1 TABLET: 8.6; 5 TABLET, FILM COATED ORAL at 10:04

## 2019-04-09 RX ADMIN — IPRATROPIUM BROMIDE AND ALBUTEROL SULFATE 3 ML: .5; 3 SOLUTION RESPIRATORY (INHALATION) at 11:04

## 2019-04-09 RX ADMIN — GUAIFENESIN 600 MG: 600 TABLET, EXTENDED RELEASE ORAL at 10:04

## 2019-04-09 RX ADMIN — METHYLPREDNISOLONE SODIUM SUCCINATE 80 MG: 125 INJECTION, POWDER, FOR SOLUTION INTRAMUSCULAR; INTRAVENOUS at 05:04

## 2019-04-09 RX ADMIN — AMLODIPINE BESYLATE 10 MG: 5 TABLET ORAL at 10:04

## 2019-04-09 RX ADMIN — FLUTICASONE FUROATE AND VILANTEROL TRIFENATATE 1 PUFF: 100; 25 POWDER RESPIRATORY (INHALATION) at 01:04

## 2019-04-09 RX ADMIN — DOXYCYCLINE HYCLATE 100 MG: 100 TABLET, COATED ORAL at 10:04

## 2019-04-09 RX ADMIN — PANTOPRAZOLE SODIUM 40 MG: 40 TABLET, DELAYED RELEASE ORAL at 10:04

## 2019-04-09 RX ADMIN — OXYMETAZOLINE HYDROCHLORIDE 2 SPRAY: 5 SPRAY NASAL at 10:04

## 2019-04-09 RX ADMIN — LISINOPRIL 40 MG: 20 TABLET ORAL at 10:04

## 2019-04-09 RX ADMIN — IPRATROPIUM BROMIDE AND ALBUTEROL SULFATE 3 ML: .5; 3 SOLUTION RESPIRATORY (INHALATION) at 07:04

## 2019-04-09 NOTE — PROGRESS NOTES
1430 TN faxed clinicals to [facility] at [phone] for oxygen authorization; awaiting confirmed receipt and provider.    1440 TN sent message to Dania of [facility] regarding oxygen and nebulizer orders; awaiting return contact.    1455 TN contacted [facility] at [phone] to schedule PCP f/u with Pulmonology referral; spoke with Laura. Appt, 04/11/19 at 10:10 AM with a referral to Pulmonology by PCP.    1520 TN contacted [facility] at [phone]; spoke with Staci who stated Dania is working on it. TN provided contact information.    1525 TN received a call from [facility]; stating the oxygen provider is [facility].    1545 TN contacted [facility] at [phone]; spoke with Adeline; informed of pt's oxygen needs. Faxed clinicals to [phone].    1540 TN faxed clinicals with nebulizer order to [facility] at [phone]; awaiting confirmation and assigned provider.    1700 TN contacted [facility] at [phone]; was connected to the after hours service; spoke with Femi who I informed that Adeline had spoken to Cassie , that oxygen would be arriving any minute which was about 30 minutes ago. Femi is contacting the  and will contact TN to inform of whereabouts. Femi contacted TN and stated  was about 15 minutes away.    TN contacted floor nurse, Lissa, to inform of oxygen arrival in 15 minutes.

## 2019-04-09 NOTE — PLAN OF CARE
04/09/19 1703   Final Note   Assessment Type Final Discharge Note   Anticipated Discharge Disposition Home   What phone number can be called within the next 1-3 days to see how you are doing after discharge?   (Listed in chart)   Hospital Follow Up  Appt(s) scheduled? Yes   Discharge plans and expectations educations in teach back method with documentation complete? Yes   Right Care Referral Info   Post Acute Recommendation No Care

## 2019-04-09 NOTE — PLAN OF CARE
Problem: Functional Ability Impaired COPD (Chronic Obstructive Pulmonary Disease)  Goal: Optimal Level of Functional [place]  Outcome: Ongoing (interventions implemented as appropriate)  Monitored. O2 n/c not used. No acute resp distress noted.

## 2019-04-09 NOTE — NURSING
Testing for Home O2     O2 Sats on RA at rest=____88%______     O2 Sats on RA with exercise= ____77%________     O2 Sats on _2__L O2 with exercise=__97%__________

## 2019-04-09 NOTE — NURSING
Patient at rest on room air is 88%.  Walked with patient around the nurses station on the west side.  Oxygen saturation dropped to 77% on room air

## 2019-04-09 NOTE — PROGRESS NOTES
TN taught Symptoms and Problems for COPD home care with pt and   with teach back:  1. Coughing up brown sputum, 2. Increase use of inhaler, 3. SOB. TN placed education sheet in [employer]..     Help at home will be from sister,  Naz, assisting in pt's recovery.     TN taught patient about things he is responsible for when discharged TO HELP WITH  RECOVERY:  Particularly on how to Manage his Care At Home:  1. Getting his prescriptions filled.  2. Taking his medications as directed. DO NOT MISS ANY DOSES!  3. Going to his follow-up doctor appointments.   .  Heidi Brooks, RN, BSN, STN CCM

## 2019-04-09 NOTE — PROGRESS NOTES
GERALD contacted [facility] @ [phone] to follow-up order for nebulizer, SW spoke to [facility] that reported nebulizer still being processed. GERALD contacted [facility] and spoke to Adeline that confirmed she received orders and authorization for oxygen, confirmed oxygen will be delivered any minute.

## 2019-04-09 NOTE — PLAN OF CARE
Problem: Adult Inpatient Plan of Care  Goal: Plan of Care Review  Outcome: Ongoing (interventions implemented as appropriate)     04/08/19 4945   Plan of Care Review   Plan of Care Reviewed With patient   Patient is on iv steroids and receiving breathing treatments. Oxygen levels measured at rest and while ambulating

## 2019-04-09 NOTE — PROGRESS NOTES
Testing for Home O2    O2 Sats on RA at rest=__________    O2 Sats on RA with exercise= ____________    O2 Sats on ___L O2 with exercise=____________

## 2019-04-10 NOTE — NURSING
Patient was assisted off the unit by nursing staff via w/c. Patient discharged with all belongings. Prior to discharge, patient  Had no apparent distress.

## 2019-04-10 NOTE — PROGRESS NOTES
1009 TN contacted [facility] at [phone] option 2; spoke with saturnino regarding f/u on nebulizer. Transferred TN to pharmacy. Pharmacy stated it was contracted out to [facility].    TN contacted [facility]; spoke with Femi who stated pt will have to contact them stating they are at home. TN informed pt was dischsrged on 04/09/19. Femi stated she has pt's contact information and will contact pt to go out and train/educate on nebulizer.

## 2019-04-11 ENCOUNTER — PATIENT OUTREACH (OUTPATIENT)
Dept: ADMINISTRATIVE | Facility: CLINIC | Age: 69
End: 2019-04-11

## 2019-04-11 NOTE — PROGRESS NOTES
C3 nurse attempted to contact patient. No answer. The following message was left for the patient to return the call:  Good morning  I am a nurse calling on behalf of [facility] from the [facility]ination [facility].  This is a Transitional Care Call for Anthony Membreno . When you have a moment please contact us at [phone] or [phone] Monday through Friday, between the hours of 8 am to 4 pm. We look forward to speaking with you. On behalf of [facility] have a nice day.    The patient has a scheduled [facility] appointment with [facility]  on 4/11 @ 1010. Message sent to Physician staff.[facility]

## 2019-04-11 NOTE — PATIENT INSTRUCTIONS
Care for COPD  You have been diagnosed with chronic obstructive pulmonary disease (COPD). This is a name given to a group of diseases that limit the flow of air in and out of your lungs, making it harder to breathe. With COPD, you are also more likely to get lung infections. COPD includes chronic bronchitis and emphysema, which are most often caused by heavy, long-term cigarette smoking.  Home Care  Break the smoking habit.  Enroll in a stop-smoking program to increase your chances of success.  Ask your doctor about medications or other methods to help you quit.  Ask family members to quit smoking as well.  Don't allow people to smoke in your home or when they are around you.  Protect yourself from infection.  Wash your hands often. Do your best to keep your hands away from your face. Most germs are spread from your hands to your mouth.  Get a flu shot every year. Ask your doctor about a pneumonia vaccination.  Avoid crowds. It's especially important to do this in the winter when more people have colds and flu.  Get enough sleep, exercise regularly, and eat a balanced diet.  Learn postural drainage and percussion, techniques that can help you cough up extra mucus. This extra mucus can trap germs in your lungs.  Take your medications exactly as directed. Don't skip doses.  Stress can make COPD worse. Use these stress-management techniques:  Find a quiet place and sit or lie in a comfortable position.  Close your eyes and perform breathing exercises for several minutes.  Follow-Up  Make a follow-up appointment as directed by our staff.    When to Call Your Doctor  Call your doctor immediately if you have any of the following:  Shortness of breath, wheezing, or coughing  Increased mucus  Yellow, green, bloody, or smelly mucus  Fever or chills  Tightness in your chest that does not go away with rest or medication  An irregular heartbeat  Swollen ankles                                     © 1748-7821 [address]  [place] [address]. All rights reserved. This information is not intended as a substitute for professional medical care. Always follow your healthcare professional's instructions.

## 2019-04-11 NOTE — PROGRESS NOTES
"Pt stating he has not received nebulizer from [facility]. He has spoken with them today and is waiting home for delivery. Contacted TERA Dennis to inquire if any reason and stated, "Femi at [facility] could not reach pt with the numbers available as was the case with C3 nurse this a.m. I was able to get pt's new # by calling his sister. Pt reporting he has given [facility] his correct #. TERA Florez gave me [facility] # and Femi said neb was delivered "about 30 min ago." Issue resolved.  "

## 2019-04-23 NOTE — DISCHARGE SUMMARY
[facility] Hospital Medicine  Discharge Summary      Patient Name: Anthony Membreno  MRN: [id number]  Admission Date: 4/6/2019  Hospital Length of Stay: 3 days  Discharge Date and Time: 4/9/2019  Attending Physician: No att. providers found   Discharging Provider: Prema Hernandez MD  Primary Care Provider: [facility]      HPI:   Mr. Anthony Membreno is a 68 y.o. male with essential hypertension, chronic hepatitis C, and tobacco abuse who presents to [facility] ED with complaints of dyspnea for the past 3 days.  Dyspnea is mainly on exertion but in order to breathe at rest he had to direct a fan towards face.  The dyspnea is associated with wheezing and a cough that is productive of white expectorant.  He has pain across his chest only on coughing.  He denies any fevers, chills, nausea, vomiting, hemoptysis, nor any lower extremity pain or swelling. He has not had any recent travel nor sick contacts.  He does continue to smoke but is down to 2 cigarettes a day.  He has been smoking for the past 50 years and was smoking 1.5 packs a day at his worst.  He has never been diagnosed with COPD but often has bronchitis.  He otherwise has been in his usual state of health.    * No surgery found *      Hospital Course:   69 y/o male long time smoker with probable undiagnosed COPD presented with shortness of breath.  Probable COPD exacerbation and started on nebs, oxygen, steroids and Doxy.    Pt will continue with home oxygen and, prednisone and antibiotic. Follow up PCP as scheduled.      Consults:       Final Active Diagnoses:    Diagnosis Date Noted POA    PRINCIPAL PROBLEM:  COPD with acute exacerbation [J44.1] 04/07/2019 Yes    Acute respiratory failure with hypoxia and hypercapnia [J96.01, J96.02] 04/07/2019 Yes    Essential hypertension [I10] 04/07/2019 Yes     Chronic    Chronic hepatitis C without hepatic coma [B18.2] 04/07/2019 Yes     Chronic    Tobacco abuse [Z72.0] 04/07/2019 Yes     Chronic  "     Problems Resolved During this Admission:       Discharged Condition: good    Disposition: Home or Self Care    Follow Up:  Follow-up Information     [facility] On 4/11/2019.    Why:  Outpatient Services PCP Follow-Up Thursday at 10:10 AM. PCP will refer to Pulmonology.  Contact information:  [place] [address]  [phone]             [facility].    Specialty:  DME Provider  Why:  DME (Home Oxygen)  Contact information:  [facility] [place] [address]  [phone]                 Patient Instructions:      NEBULIZER FOR HOME USE     Order Specific Question Answer Comments   Height: 5' 5" (1.651 m)    Weight: 52.4 kg (115 lb 7 oz)    Does patient have medical equipment at home? cane, straight    Length of need (1-99 months): 99      OXYGEN FOR HOME USE     Order Specific Question Answer Comments   Liter Flow 2    Duration Continuous    Qualifying SpO2: 88%    Testing done at: Rest    Route nasal cannula    Device home concentrator with portable unit    Length of need (in months): 99 mos    Patient condition with qualifying saturation COPD    Height: 5' 5" (1.651 m)    Weight: 52.4 kg (115 lb 7 oz)    Does patient have medical equipment at home? cane, straight    Alternative treatment measures have been tried or considered and deemed clinically ineffective. Yes      Diet Cardiac     Notify your health care provider if you experience any of the following:  temperature >100.4     Notify your health care provider if you experience any of the following:  difficulty breathing or increased cough     Activity as tolerated           Pending Diagnostic Studies:     None         Medications:  Reconciled Home Medications:      Medication List      START taking these medications    albuterol-ipratropium 2.5 mg-0.5 mg/3 mL nebulizer solution  Commonly known as:  DUO-NEB  Take 3 mLs by nebulization every 4 (four) hours as needed for Wheezing. Rescue     fluticasone-vilanterol " 100-25 mcg/dose diskus inhaler  Commonly known as:  BREO  Inhale 1 puff into the lungs once daily. Controller     guaiFENesin 600 mg 12 hr tablet  Commonly known as:  MUCINEX  Take 1 tablet (600 mg total) by mouth 2 (two) times daily.     nicotine 21 mg/24 hr  Commonly known as:  NICODERM CQ  Place 1 patch onto the skin once daily.        CHANGE how you take these medications    VENTOLIN HFA 90 mcg/actuation inhaler  Generic drug:  albuterol  Inhale 2 puffs into the lungs every 6 (six) hours as needed for Wheezing. Rescue  What changed:  Another medication with the same name was removed. Continue taking this medication, and follow the directions you see here.        CONTINUE taking these medications    meloxicam 7.5 MG tablet  Commonly known as:  MOBIC  Take 7.5 mg by mouth 2 (two) times daily as needed for Pain.        STOP taking these medications    amLODIPine 10 MG tablet  Commonly known as:  NORVASC     lisinopril 40 MG tablet  Commonly known as:  PRINIVIL,ZESTRIL        ASK your doctor about these medications    doxycycline 100 MG tablet  Commonly known as:  VIBRA-TABS  Take 1 tablet (100 mg total) by mouth every 12 (twelve) hours. for 5 days  Ask about: Should I take this medication?     predniSONE 20 MG tablet  Commonly known as:  DELTASONE  Take 2 tablets (40 mg total) by mouth once daily. for 5 days  Ask about: Should I take this medication?            Indwelling Lines/Drains at time of discharge:   Lines/Drains/Airways          None          Time spent on the discharge of patient: 30 minutes  Patient was seen and examined on the date of discharge and determined to be suitable for discharge.         Prema Hernandez MD  Department of Hospital Medicine  [facility]

## 2019-05-03 ENCOUNTER — HOSPITAL ENCOUNTER (OUTPATIENT)
Facility: HOSPITAL | Age: 69
Discharge: HOME | End: 2019-05-04
Attending: EMERGENCY MEDICINE | Admitting: EMERGENCY MEDICINE
Payer: MEDICARE

## 2019-05-03 DIAGNOSIS — R07.9 CHEST PAIN: ICD-10-CM

## 2019-05-03 DIAGNOSIS — I10 ESSENTIAL HYPERTENSION: Chronic | ICD-10-CM

## 2019-05-03 DIAGNOSIS — Z72.0 TOBACCO ABUSE: Chronic | ICD-10-CM

## 2019-05-03 DIAGNOSIS — J44.9 CHRONIC OBSTRUCTIVE PULMONARY DISEASE, UNSPECIFIED COPD TYPE: Primary | ICD-10-CM

## 2019-05-03 LAB
BASOPHILS # BLD AUTO: 0.17 K/UL (ref 0–0.2)
BASOPHILS NFR BLD: 2 % (ref 0–1.9)
DIFFERENTIAL METHOD: ABNORMAL
EOSINOPHIL # BLD AUTO: 1.4 K/UL (ref 0–0.5)
EOSINOPHIL NFR BLD: 16.6 % (ref 0–8)
ERYTHROCYTE [DISTWIDTH] IN BLOOD BY AUTOMATED COUNT: 13.8 % (ref 11.5–14.5)
HCT VFR BLD AUTO: 42.2 % (ref 40–54)
HGB BLD-MCNC: 14 G/DL (ref 14–18)
LYMPHOCYTES # BLD AUTO: 3.3 K/UL (ref 1–4.8)
LYMPHOCYTES NFR BLD: 39.5 % (ref 18–48)
MCH RBC QN AUTO: 31.1 PG (ref 27–31)
MCHC RBC AUTO-ENTMCNC: 33.2 G/DL (ref 32–36)
MCV RBC AUTO: 94 FL (ref 82–98)
MONOCYTES # BLD AUTO: 1 K/UL (ref 0.3–1)
MONOCYTES NFR BLD: 11.6 % (ref 4–15)
NEUTROPHILS # BLD AUTO: 2.6 K/UL (ref 1.8–7.7)
NEUTROPHILS NFR BLD: 30.3 % (ref 38–73)
PLATELET # BLD AUTO: 209 K/UL (ref 150–350)
PMV BLD AUTO: 11.7 FL (ref 9.2–12.9)
RBC # BLD AUTO: 4.5 M/UL (ref 4.6–6.2)
WBC # BLD AUTO: 8.44 K/UL (ref 3.9–12.7)

## 2019-05-03 PROCEDURE — 93010 ELECTROCARDIOGRAM REPORT: CPT | Mod: ,,, | Performed by: INTERNAL MEDICINE

## 2019-05-03 PROCEDURE — 80053 COMPREHEN METABOLIC PANEL: CPT

## 2019-05-03 PROCEDURE — 85025 COMPLETE CBC W/AUTO DIFF WBC: CPT

## 2019-05-03 PROCEDURE — 96374 THER/PROPH/DIAG INJ IV PUSH: CPT

## 2019-05-03 PROCEDURE — 99285 EMERGENCY DEPT VISIT HI MDM: CPT | Mod: 25

## 2019-05-03 PROCEDURE — 93010 EKG 12-LEAD: ICD-10-PCS | Mod: ,,, | Performed by: INTERNAL MEDICINE

## 2019-05-03 PROCEDURE — 83880 ASSAY OF NATRIURETIC PEPTIDE: CPT

## 2019-05-03 PROCEDURE — 94644 CONT INHLJ TX 1ST HOUR: CPT

## 2019-05-03 PROCEDURE — 84484 ASSAY OF TROPONIN QUANT: CPT

## 2019-05-03 PROCEDURE — 93005 ELECTROCARDIOGRAM TRACING: CPT

## 2019-05-03 RX ORDER — NITROGLYCERIN 0.4 MG/1
0.4 TABLET SUBLINGUAL EVERY 5 MIN PRN
Status: DISCONTINUED | OUTPATIENT
Start: 2019-05-03 | End: 2019-05-04 | Stop reason: HOSPADM

## 2019-05-03 RX ORDER — ASPIRIN 325 MG
325 TABLET ORAL
Status: COMPLETED | OUTPATIENT
Start: 2019-05-04 | End: 2019-05-03

## 2019-05-03 RX ORDER — ALBUTEROL SULFATE 2.5 MG/.5ML
10 SOLUTION RESPIRATORY (INHALATION)
Status: COMPLETED | OUTPATIENT
Start: 2019-05-04 | End: 2019-05-03

## 2019-05-03 RX ORDER — METHYLPREDNISOLONE SOD SUCC 125 MG
125 VIAL (EA) INJECTION
Status: COMPLETED | OUTPATIENT
Start: 2019-05-04 | End: 2019-05-04

## 2019-05-03 RX ORDER — IPRATROPIUM BROMIDE 0.5 MG/2.5ML
500 SOLUTION RESPIRATORY (INHALATION)
Status: COMPLETED | OUTPATIENT
Start: 2019-05-04 | End: 2019-05-03

## 2019-05-03 RX ADMIN — ALBUTEROL SULFATE 10 MG: 2.5 SOLUTION RESPIRATORY (INHALATION) at 11:05

## 2019-05-03 RX ADMIN — IPRATROPIUM BROMIDE 500 MCG: 0.5 SOLUTION RESPIRATORY (INHALATION) at 11:05

## 2019-05-03 RX ADMIN — ASPIRIN 325 MG ORAL TABLET 325 MG: 325 PILL ORAL at 11:05

## 2019-05-03 NOTE — LETTER
May 4, 2019         [place] [address]  Phone: [phone]  Fax: [phone]       Patient: Anthony Membreno   YOB: 1950  Date of Visit: 05/04/2019    To Whom It May Concern:    Gustabo Membreno  was at [facility] on 5/3/2019 and 05/04/2019. She may return to work/school on 5/6/2019 no restrictions. If you have any questions or concerns, or if I can be of further assistance, please do not hesitate to contact me.    Sincerely,      Yann Friedman PA-C

## 2019-05-04 VITALS
WEIGHT: 115.5 LBS | HEART RATE: 90 BPM | RESPIRATION RATE: 18 BRPM | DIASTOLIC BLOOD PRESSURE: 62 MMHG | SYSTOLIC BLOOD PRESSURE: 103 MMHG | HEIGHT: 65 IN | BODY MASS INDEX: 19.24 KG/M2 | OXYGEN SATURATION: 95 % | TEMPERATURE: 98 F

## 2019-05-04 PROBLEM — J44.9 CHRONIC OBSTRUCTIVE PULMONARY DISEASE: Status: ACTIVE | Noted: 2019-05-04

## 2019-05-04 LAB
ALBUMIN SERPL BCP-MCNC: 3.9 G/DL (ref 3.5–5.2)
ALLENS TEST: ABNORMAL
ALLENS TEST: ABNORMAL
ALP SERPL-CCNC: 67 U/L (ref 55–135)
ALT SERPL W/O P-5'-P-CCNC: 48 U/L (ref 10–44)
ANION GAP SERPL CALC-SCNC: 8 MMOL/L (ref 8–16)
AST SERPL-CCNC: 36 U/L (ref 10–40)
BILIRUB SERPL-MCNC: 0.4 MG/DL (ref 0.1–1)
BNP SERPL-MCNC: 17 PG/ML (ref 0–99)
BUN SERPL-MCNC: 23 MG/DL (ref 8–23)
CALCIUM SERPL-MCNC: 9.1 MG/DL (ref 8.7–10.5)
CHLORIDE SERPL-SCNC: 110 MMOL/L (ref 95–110)
CO2 SERPL-SCNC: 27 MMOL/L (ref 23–29)
CREAT SERPL-MCNC: 0.9 MG/DL (ref 0.5–1.4)
DELSYS: ABNORMAL
DELSYS: ABNORMAL
EST. GFR  (AFRICAN AMERICAN): >60 ML/MIN/1.73 M^2
EST. GFR  (NON AFRICAN AMERICAN): >60 ML/MIN/1.73 M^2
GLUCOSE SERPL-MCNC: 107 MG/DL (ref 70–110)
HCO3 UR-SCNC: 22.9 MMOL/L (ref 24–28)
HCO3 UR-SCNC: 26.1 MMOL/L (ref 24–28)
MODE: ABNORMAL
MODE: ABNORMAL
PCO2 BLDA: 40.9 MMHG (ref 35–45)
PCO2 BLDA: 41.3 MMHG (ref 35–45)
PH SMN: 7.35 [PH] (ref 7.35–7.45)
PH SMN: 7.41 [PH] (ref 7.35–7.45)
PO2 BLDA: 56 MMHG (ref 40–60)
PO2 BLDA: 69 MMHG (ref 80–100)
POC BE: -3 MMOL/L
POC BE: 1 MMOL/L
POC SATURATED O2: 89 % (ref 95–100)
POC SATURATED O2: 93 % (ref 95–100)
POC TCO2: 24 MMOL/L (ref 23–27)
POC TCO2: 27 MMOL/L (ref 24–29)
POTASSIUM SERPL-SCNC: 4.4 MMOL/L (ref 3.5–5.1)
PROT SERPL-MCNC: 6.8 G/DL (ref 6–8.4)
SAMPLE: ABNORMAL
SAMPLE: ABNORMAL
SITE: ABNORMAL
SITE: ABNORMAL
SODIUM SERPL-SCNC: 145 MMOL/L (ref 136–145)
SP02: 93
SP02: 94
TROPONIN I SERPL DL<=0.01 NG/ML-MCNC: <0.006 NG/ML (ref 0–0.03)

## 2019-05-04 PROCEDURE — 82803 BLOOD GASES ANY COMBINATION: CPT

## 2019-05-04 PROCEDURE — 84484 ASSAY OF TROPONIN QUANT: CPT | Mod: 91

## 2019-05-04 PROCEDURE — 25000003 PHARM REV CODE 250: Performed by: HOSPITALIST

## 2019-05-04 PROCEDURE — 25000242 PHARM REV CODE 250 ALT 637 W/ HCPCS: Performed by: PHYSICIAN ASSISTANT

## 2019-05-04 PROCEDURE — 25000242 PHARM REV CODE 250 ALT 637 W/ HCPCS: Performed by: HOSPITALIST

## 2019-05-04 PROCEDURE — 99900035 HC TECH TIME PER 15 MIN (STAT)

## 2019-05-04 PROCEDURE — 25000242 PHARM REV CODE 250 ALT 637 W/ HCPCS: Performed by: EMERGENCY MEDICINE

## 2019-05-04 PROCEDURE — G0378 HOSPITAL OBSERVATION PER HR: HCPCS

## 2019-05-04 PROCEDURE — 63600175 PHARM REV CODE 636 W HCPCS: Performed by: HOSPITALIST

## 2019-05-04 PROCEDURE — 96372 THER/PROPH/DIAG INJ SC/IM: CPT | Mod: 59 | Performed by: EMERGENCY MEDICINE

## 2019-05-04 PROCEDURE — 96376 TX/PRO/DX INJ SAME DRUG ADON: CPT | Performed by: EMERGENCY MEDICINE

## 2019-05-04 PROCEDURE — 63600175 PHARM REV CODE 636 W HCPCS: Performed by: PHYSICIAN ASSISTANT

## 2019-05-04 PROCEDURE — 36600 WITHDRAWAL OF ARTERIAL BLOOD: CPT

## 2019-05-04 PROCEDURE — 94640 AIRWAY INHALATION TREATMENT: CPT

## 2019-05-04 PROCEDURE — 96374 THER/PROPH/DIAG INJ IV PUSH: CPT

## 2019-05-04 PROCEDURE — 94799 UNLISTED PULMONARY SVC/PX: CPT

## 2019-05-04 PROCEDURE — C9113 INJ PANTOPRAZOLE SODIUM, VIA: HCPCS | Performed by: EMERGENCY MEDICINE

## 2019-05-04 PROCEDURE — 36415 COLL VENOUS BLD VENIPUNCTURE: CPT

## 2019-05-04 PROCEDURE — 25000003 PHARM REV CODE 250: Performed by: EMERGENCY MEDICINE

## 2019-05-04 PROCEDURE — 96375 TX/PRO/DX INJ NEW DRUG ADDON: CPT | Performed by: EMERGENCY MEDICINE

## 2019-05-04 PROCEDURE — S4991 NICOTINE PATCH NONLEGEND: HCPCS | Performed by: HOSPITALIST

## 2019-05-04 PROCEDURE — 84484 ASSAY OF TROPONIN QUANT: CPT

## 2019-05-04 PROCEDURE — 63600175 PHARM REV CODE 636 W HCPCS: Performed by: EMERGENCY MEDICINE

## 2019-05-04 RX ORDER — BENZONATATE 100 MG/1
100 CAPSULE ORAL 3 TIMES DAILY PRN
Status: DISCONTINUED | OUTPATIENT
Start: 2019-05-04 | End: 2019-05-04 | Stop reason: HOSPADM

## 2019-05-04 RX ORDER — IPRATROPIUM BROMIDE AND ALBUTEROL SULFATE 2.5; .5 MG/3ML; MG/3ML
3 SOLUTION RESPIRATORY (INHALATION) EVERY 6 HOURS
Status: DISCONTINUED | OUTPATIENT
Start: 2019-05-04 | End: 2019-05-04

## 2019-05-04 RX ORDER — SODIUM CHLORIDE 0.9 % (FLUSH) 0.9 %
10 SYRINGE (ML) INJECTION
Status: DISCONTINUED | OUTPATIENT
Start: 2019-05-04 | End: 2019-05-04 | Stop reason: HOSPADM

## 2019-05-04 RX ORDER — GUAIFENESIN/DEXTROMETHORPHAN 100-10MG/5
10 SYRUP ORAL EVERY 6 HOURS
Status: DISCONTINUED | OUTPATIENT
Start: 2019-05-04 | End: 2019-05-04 | Stop reason: HOSPADM

## 2019-05-04 RX ORDER — IBUPROFEN 200 MG
1 TABLET ORAL DAILY
Qty: 28 PATCH | Refills: 0 | Status: SHIPPED | OUTPATIENT
Start: 2019-05-04

## 2019-05-04 RX ORDER — HEPARIN SODIUM 5000 [USP'U]/ML
5000 INJECTION, SOLUTION INTRAVENOUS; SUBCUTANEOUS EVERY 8 HOURS
Status: DISCONTINUED | OUTPATIENT
Start: 2019-05-04 | End: 2019-05-04

## 2019-05-04 RX ORDER — PANTOPRAZOLE SODIUM 40 MG/10ML
40 INJECTION, POWDER, LYOPHILIZED, FOR SOLUTION INTRAVENOUS
Status: COMPLETED | OUTPATIENT
Start: 2019-05-04 | End: 2019-05-04

## 2019-05-04 RX ORDER — GUAIFENESIN 600 MG/1
600 TABLET, EXTENDED RELEASE ORAL 2 TIMES DAILY
Status: DISCONTINUED | OUTPATIENT
Start: 2019-05-04 | End: 2019-05-04 | Stop reason: HOSPADM

## 2019-05-04 RX ORDER — IPRATROPIUM BROMIDE AND ALBUTEROL SULFATE 2.5; .5 MG/3ML; MG/3ML
3 SOLUTION RESPIRATORY (INHALATION) EVERY 4 HOURS
Status: DISCONTINUED | OUTPATIENT
Start: 2019-05-04 | End: 2019-05-04 | Stop reason: HOSPADM

## 2019-05-04 RX ORDER — METHYLPREDNISOLONE SOD SUCC 125 MG
80 VIAL (EA) INJECTION EVERY 8 HOURS
Status: DISCONTINUED | OUTPATIENT
Start: 2019-05-04 | End: 2019-05-04 | Stop reason: HOSPADM

## 2019-05-04 RX ORDER — PREDNISONE 20 MG/1
40 TABLET ORAL DAILY
Qty: 10 TABLET | Refills: 0 | Status: SHIPPED | OUTPATIENT
Start: 2019-05-04 | End: 2019-05-09

## 2019-05-04 RX ORDER — METHYLPREDNISOLONE SOD SUCC 125 MG
125 VIAL (EA) INJECTION EVERY 8 HOURS
Status: DISCONTINUED | OUTPATIENT
Start: 2019-05-04 | End: 2019-05-04

## 2019-05-04 RX ORDER — ENOXAPARIN SODIUM 100 MG/ML
40 INJECTION SUBCUTANEOUS EVERY 24 HOURS
Status: DISCONTINUED | OUTPATIENT
Start: 2019-05-04 | End: 2019-05-04 | Stop reason: HOSPADM

## 2019-05-04 RX ORDER — IPRATROPIUM BROMIDE AND ALBUTEROL SULFATE 2.5; .5 MG/3ML; MG/3ML
3 SOLUTION RESPIRATORY (INHALATION)
Status: COMPLETED | OUTPATIENT
Start: 2019-05-04 | End: 2019-05-04

## 2019-05-04 RX ORDER — FLUTICASONE FUROATE AND VILANTEROL 100; 25 UG/1; UG/1
1 POWDER RESPIRATORY (INHALATION) DAILY
Status: DISCONTINUED | OUTPATIENT
Start: 2019-05-04 | End: 2019-05-04 | Stop reason: HOSPADM

## 2019-05-04 RX ORDER — IPRATROPIUM BROMIDE AND ALBUTEROL SULFATE 2.5; .5 MG/3ML; MG/3ML
3 SOLUTION RESPIRATORY (INHALATION) EVERY 4 HOURS PRN
Status: DISCONTINUED | OUTPATIENT
Start: 2019-05-04 | End: 2019-05-04

## 2019-05-04 RX ORDER — IBUPROFEN 200 MG
1 TABLET ORAL DAILY
Status: DISCONTINUED | OUTPATIENT
Start: 2019-05-04 | End: 2019-05-04 | Stop reason: HOSPADM

## 2019-05-04 RX ADMIN — IPRATROPIUM BROMIDE AND ALBUTEROL SULFATE 3 ML: .5; 3 SOLUTION RESPIRATORY (INHALATION) at 03:05

## 2019-05-04 RX ADMIN — ENOXAPARIN SODIUM 40 MG: 100 INJECTION SUBCUTANEOUS at 05:05

## 2019-05-04 RX ADMIN — PANTOPRAZOLE SODIUM 40 MG: 40 INJECTION, POWDER, FOR SOLUTION INTRAVENOUS at 05:05

## 2019-05-04 RX ADMIN — GUAIFENESIN 600 MG: 600 TABLET, EXTENDED RELEASE ORAL at 09:05

## 2019-05-04 RX ADMIN — NITROGLYCERIN 0.4 MG: 0.4 TABLET SUBLINGUAL at 12:05

## 2019-05-04 RX ADMIN — HEPARIN SODIUM 5000 UNITS: 5000 INJECTION, SOLUTION INTRAVENOUS; SUBCUTANEOUS at 02:05

## 2019-05-04 RX ADMIN — HEPARIN SODIUM 5000 UNITS: 5000 INJECTION, SOLUTION INTRAVENOUS; SUBCUTANEOUS at 06:05

## 2019-05-04 RX ADMIN — IPRATROPIUM BROMIDE AND ALBUTEROL SULFATE 3 ML: .5; 3 SOLUTION RESPIRATORY (INHALATION) at 02:05

## 2019-05-04 RX ADMIN — BENZONATATE 100 MG: 100 CAPSULE ORAL at 12:05

## 2019-05-04 RX ADMIN — GUAIFENESIN AND DEXTROMETHORPHAN 10 ML: 100; 10 SYRUP ORAL at 11:05

## 2019-05-04 RX ADMIN — METHYLPREDNISOLONE SODIUM SUCCINATE 125 MG: 125 INJECTION, POWDER, FOR SOLUTION INTRAMUSCULAR; INTRAVENOUS at 05:05

## 2019-05-04 RX ADMIN — SODIUM CHLORIDE 1000 ML: 0.9 INJECTION, SOLUTION INTRAVENOUS at 03:05

## 2019-05-04 RX ADMIN — IPRATROPIUM BROMIDE AND ALBUTEROL SULFATE 3 ML: .5; 3 SOLUTION RESPIRATORY (INHALATION) at 12:05

## 2019-05-04 RX ADMIN — GUAIFENESIN AND DEXTROMETHORPHAN 10 ML: 100; 10 SYRUP ORAL at 05:05

## 2019-05-04 RX ADMIN — GUAIFENESIN AND DEXTROMETHORPHAN 10 ML: 100; 10 SYRUP ORAL at 06:05

## 2019-05-04 RX ADMIN — NICOTINE 1 PATCH: 21 PATCH, EXTENDED RELEASE TRANSDERMAL at 09:05

## 2019-05-04 RX ADMIN — IPRATROPIUM BROMIDE AND ALBUTEROL SULFATE 3 ML: .5; 3 SOLUTION RESPIRATORY (INHALATION) at 07:05

## 2019-05-04 RX ADMIN — METHYLPREDNISOLONE SODIUM SUCCINATE 125 MG: 125 INJECTION, POWDER, FOR SOLUTION INTRAMUSCULAR; INTRAVENOUS at 02:05

## 2019-05-04 RX ADMIN — METHYLPREDNISOLONE SODIUM SUCCINATE 125 MG: 125 INJECTION, POWDER, FOR SOLUTION INTRAMUSCULAR; INTRAVENOUS at 12:05

## 2019-05-04 NOTE — PROGRESS NOTES
[facility] Hospital Medicine  Progress Note    Patient Name: Anthony Membreno  MRN: [id number]  Patient Class: OP- Observation   Admission Date: 5/3/2019  Length of Stay: 0 days  Attending Physician: Kathryn Hernandez MD  Primary Care Provider: [facility]        Subjective:     Principal Problem:COPD with acute exacerbation    HPI:  Anthony Membreno is a 68 y.o. male that (in part)  has a past medical history of Alcohol abuse, Chronic bronchitis, Hepatitis C, and Hypertension.  Presents to [facility] Emergency Department complaining of severe shortness of breath as described below in detail.  He was diagnosed with COPD approximately 6 months ago.     Description of symptoms    Location:  Chest/lungs  Onset:  Subacute onset 2 days ago with progressive worsening  Character:  Severe shortness of breath and dyspnea with exertion  Frequency:  Daily frequency  Duration:  Constant duration since onset  Associated Symptoms:  Generalized fatigue and malaise.  Dyspnea with exertion.  Chest heaviness.  Denies syncope or palpitations.  Radiation:  Throughout chest  Exacerbating factors:  Worsened with exertion  Relieving factors:  Some relief with nebulizer treatments and supplemental oxygen given in the ED         In the emergency department routine laboratory studies, chest x-ray, EKG, cardiac enzymes were obtained.  There was no evidence of acute coronary syndrome, however there was concern that the patient was having hypoxemia secondary to acute COPD exacerbation and long-term tobacco abuse.  He was given nebulizer treatments and supplemental oxygen.  Was also given such as steroids.  He had some improvement in symptoms but he was unable to ambulate without significant oxygen desaturations.    Hospital medicine has been asked to admit for further evaluation and treatment.     Hospital Course:  Anthony Membreno 68 y.o. male admitted to observation for COPD exacerbation.  Increased work of breathing in the ED despite albuterol. Chest x-ray without abnormality, and EKG of NSR. Started on duo-nebs and steroids.    Interval History: Still dyspneic with exertion and wheezing diffusely.     Review of Systems   Constitutional: Negative for chills and fever.   Eyes: Negative for photophobia and visual disturbance.   Respiratory: Positive for cough, shortness of breath and wheezing. Negative for chest tightness.    Cardiovascular: Negative for chest pain and palpitations.   Gastrointestinal: Negative for abdominal pain, nausea and vomiting.   Genitourinary: Negative for dysuria and hematuria.   Neurological: Positive for dizziness (with coughing).     Objective:     Vital Signs (Most Recent):  Temp: 97.9 °F (36.6 °C) (05/04/19 1136)  Pulse: 81 (05/04/19 1221)  Resp: 20 (05/04/19 1221)  BP: 110/66 (05/04/19 1136)  SpO2: 97 % (05/04/19 1221) Vital Signs (24h Range):  Temp:  [97.6 °F (36.4 °C)-98.3 °F (36.8 °C)] 97.9 °F (36.6 °C)  Pulse:  [70-89] 81  Resp:  [16-29] 20  SpO2:  [91 %-100 %] 97 %  BP: ()/(55-88) 110/66     Weight: 52.4 kg (115 lb 8.3 oz)  Body mass index is 19.22 kg/m².    Intake/Output Summary (Last 24 hours) at 5/4/2019 1520  Last data filed at 5/4/2019 1222  Gross per 24 hour   Intake --   Output 700 ml   Net -700 ml      Physical Exam   Constitutional: He is oriented to person, place, and time. He appears well-developed and well-nourished. No distress.   HENT:   Head: Normocephalic and atraumatic.   Right Ear: External ear normal.   Left Ear: External ear normal.   Eyes: Conjunctivae and EOM are normal. Right eye exhibits no discharge. Left eye exhibits no discharge.   Neck: Normal range of motion. No thyromegaly present.   Cardiovascular: Normal rate and regular rhythm.   No murmur heard.  Pulmonary/Chest: He is in respiratory distress. He has wheezes (diffuse wheezing anterior and posterior).   Increased work of breathing and and dyspneic with exertion.   Mild  respiratory distress in between doses of steroids. Speaking in short sentences   Abdominal: Soft. Bowel sounds are normal. He exhibits no distension and no mass. There is no tenderness.   Musculoskeletal: He exhibits no edema or deformity.   Neurological: He is alert and oriented to person, place, and time.   Skin: Skin is warm and dry.   Psychiatric: He has a normal mood and affect. His behavior is normal.   Nursing note and vitals reviewed.      Significant Labs:   ABGs:   Recent Labs   Lab 05/04/19  0414   PH 7.353   PCO2 41.3   HCO3 22.9*   POCSATURATED 93*   BE -3     CBC:   Recent Labs   Lab 05/03/19  2305   WBC 8.44   HGB 14.0   HCT 42.2        CMP:   Recent Labs   Lab 05/03/19  2305      K 4.4      CO2 27      BUN 23   CREATININE 0.9   CALCIUM 9.1   PROT 6.8   ALBUMIN 3.9   BILITOT 0.4   ALKPHOS 67   AST 36   ALT 48*   ANIONGAP 8   EGFRNONAA >60     Troponin:   Recent Labs   Lab 05/04/19  0255 05/04/19  0528 05/04/19  1129   TROPONINI <0.006 <0.006 <0.006       Significant Imaging:   Imaging Results          X-Ray Chest AP Portable (Final result)  Result time 05/04/19 00:25:31    Final result by Wanda Rey MD (05/04/19 00:25:31)                 Impression:      No acute intrathoracic abnormality detected.      Electronically signed by: Wanda Rey  Date:    05/04/2019  Time:    00:25             Narrative:    EXAMINATION:  AP PORTABLE CHEST    CLINICAL HISTORY:  Chest Pain;    TECHNIQUE:  AP portable chest radiograph was submitted.    COMPARISON:  04/07/2019    FINDINGS:  AP portable chest radiograph demonstrates a cardiac silhouette within normal limits.  Vascular calcification is seen in the aortic knob.  There is no focal consolidation, pneumothorax, or pleural effusion.  There is a stable 4 mm nodule projecting to the left of the aortic knob.  There is a nonacute fracture involving the right clavicle.  A BB is seen at the left lower chest.  There are overlying  cardiac leads.                                  Assessment/Plan:      * COPD with acute exacerbation  Wheezes diffusely throughout lungs reports wheezing at home some improvement now. Still with significant coughing and dyspnea with exertion.  -Unable to continue MDI in Obs  -IV steroids  -Supplemental O2 for 88-92%  -Duo-nebs  -mucinex  -Smoking cessation    Tobacco abuse  Greater than 3 minutes spent counseling patient on dangers of continued tobacco abuse. Prn nicotine patch.    Essential hypertension  Currently normotensive and not on any medication. Will hold starting anything.       VTE Risk Mitigation (From admission, onward)        Ordered     enoxaparin injection 40 mg  Daily      05/04/19 1606     IP VTE HIGH RISK PATIENT  Once      05/04/19 0500        VTE: lovenox  Code: full   Diet: cardiac  Dispo: pending improvement in respiratory status with exertion. Likely tomorrow      Yann Friedman PA-C  Department of Hospital Medicine   [facility]

## 2019-05-04 NOTE — ASSESSMENT & PLAN NOTE
Greater than 3 minutes spent counseling patient on dangers of continued tobacco abuse. Prn nicotine patch.

## 2019-05-04 NOTE — ASSESSMENT & PLAN NOTE
Wheezes diffusely throughout lungs reports wheezing at home some improvement now. Still with significant coughing and dyspnea with exertion.  -Unable to continue MDI in Obs  -IV steroids  -Supplemental O2 for 88-92%  -Duo-nebs  -mucinex  -Smoking cessation

## 2019-05-04 NOTE — PROGRESS NOTES
[facility] CASE MANAGEMENT                  WRITTEN DISCHARGE INFORMATION  APPOINTMENTS AND RESOURCES TO HELP YOU MANAGE YOUR CARE AT HOME BASED ON YOUR PREFERENCES:  (If an appointment is not scheduled for you when you leave the hospital, call your doctor to schedule a follow up visit within a week)    Follow-up Information     Jeanna Bronson. Call in 1 day.    Why:  out patient services  Contact information:  [place] [id number]  [phone]               Healthy Living Instructions to HELP MANAGE YOUR CARE AT HOME:  Things You are responsible for:  1.    Getting your prescriptions filled   2.    Taking your medications as directed, DO NOT MISS ANY DOSES!  3.    Following the diet and exercise recommended by your doctor  4.    Going to your follow-up doctor appointment. This is important because it allows the doctor to monitor your progress and determine if any changes need to made to your treatment plan.  5. If you have any questions about MANAGING YOUR CARE AT HOME Call the Nurse Care Line for 24/7 Assistance [phone]       Please answer any calls you may receive from [facility]. We want to continue to support you as you manage your healthcare needs. [facility] is happy to have the opportunity to serve you.      Thank you for choosing [facility] for your healthcare needs!  Your [facility] Case Management Team,

## 2019-05-04 NOTE — ED TRIAGE NOTES
"Arrived to ED via EMS with c/o SOB since 9 pm. RA 02 sat 85% when EMS arrived at patient's home. Patient was given duoneb and 125mg solumedrol en route. Pt reports doing two breathing treatments at home prior to calling EMS, but does not use his inhaler since he does not like how it "coats my lungs". Pt also has been coughing thick, white sputum x 2 weeks. Pt was seen two weeks ago here for COPD excerebration. On 2L NC home 02.   "

## 2019-05-04 NOTE — PLAN OF CARE
Problem: Adult Inpatient Plan of Care  Goal: Plan of Care Review  Outcome: Ongoing (interventions implemented as appropriate)  Pt resting comfortably on 2 l/m NC. Continue bronchodilator and lung expansion therapy via SVN tx and IS. SUMMER Molina, RRT

## 2019-05-04 NOTE — PLAN OF CARE
05/04/19 1720   Discharge Assessment   Assessment Type Discharge Planning Assessment   Patient/Family in Agreement with Plan other (see comments)

## 2019-05-04 NOTE — PLAN OF CARE
05/04/19 1616   Final Note   Assessment Type Final Discharge Note   What phone number can be called within the next 1-3 days to see how you are doing after discharge?   (SEE CHART)   Hospital Follow Up  Appt(s) scheduled? Yes   Right Care Referral Info   Post Acute Recommendation No Care   Referral Type NO CARE   Facility Name NO CARE

## 2019-05-04 NOTE — HOSPITAL COURSE
Anthony Membreno 68 y.o. male admitted to observation for COPD exacerbation. Increased work of breathing in the ED despite albuterol. Chest x-ray without abnormality, and EKG of NSR.  Patient reports started on Breo 2 weeks ago but had sore throat so discontinued.  Started on duo-nebs and steroids with continue improvement throughout day.  Patient reassessed again this 5:30 p.m., lungs diminished with no wheezing.  Patient ambulated in the hallway and O2 saturation 95% on home 2 L. patient reports breathing close to baseline and wants to go home.  Patient discharged home on prednisone 40 mg x5 days.  Encourage smoking cessation.  Patient eager to stop.  Rx for nicotine patch.  Patient will follow up with PCP at [facility] in 1 week.  Encourage patient compliance with DuoNeb 4 times a day and Breo.  Patient stable for discharge. See discharge medication list below.

## 2019-05-04 NOTE — H&P
[facility] Hospital Medicine  History & Physical    Patient Name: Anthony Membreno  MRN: [id number]  Admission Date: 05/04/2019  Attending Physician: Domenico Gilliam MD, MPH      PCP:     [facility]    CC:     Chief Complaint   Patient presents with    Shortness of Breath     x 1 day, hx of COPD 85% RA, tripoding with EMS, given duoneb and 125mg solumedrol with EMS, 100% on tx, + cough        HISTORY OF PRESENT ILLNESS:     Anthony Membreno is a 68 y.o. male that (in part)  has a past medical history of Alcohol abuse, Chronic bronchitis, Hepatitis C, and Hypertension.  Presents to [facility] Emergency Department complaining of severe shortness of breath as described below in detail.  He was diagnosed with COPD approximately 6 months ago.     Description of symptoms    Location:  Chest/lungs  Onset:  Subacute onset 2 days ago with progressive worsening  Character:  Severe shortness of breath and dyspnea with exertion  Frequency:  Daily frequency  Duration:  Constant duration since onset  Associated Symptoms:  Generalized fatigue and malaise.  Dyspnea with exertion.  Chest heaviness.  Denies syncope or palpitations.  Radiation:  Throughout chest  Exacerbating factors:  Worsened with exertion  Relieving factors:  Some relief with nebulizer treatments and supplemental oxygen given in the ED         In the emergency department routine laboratory studies, chest x-ray, EKG, cardiac enzymes were obtained.  There was no evidence of acute coronary syndrome, however there was concern that the patient was having hypoxemia secondary to acute COPD exacerbation and long-term tobacco abuse.  He was given nebulizer treatments and supplemental oxygen.  Was also given such as steroids.  He had some improvement in symptoms but he was unable to ambulate without significant oxygen desaturations.    Hospital medicine has been asked to admit for further evaluation and treatment.        REVIEW OF SYSTEMS:     -- Constitutional: No fever or chills.  -- Eyes: No visual changes, diplopia, pain, tearing, blind spots, or discharge.   -- Ears, nose, mouth, throat, and face: No congestion, sore throat, epistaxis, d/c, bleeding gums, neck stiffness masses, or dental issues.  -- Respiratory:  As above in HPI.  -- Cardiovascular:  As above in HPI.   -- Gastrointestinal: No vomiting, abdominal pain, hematemesis, melena, dyspepsia, or change in bowel habits.  -- Genitourinary: No hematuria, dysuria, frequency, urgency, nocturia, polyuria, stones, or incontinence.  -- Integument/breast: No rash, pruritis, pigmentation changes, dryness, or changes in hair  -- Hematologic/lymphatic: No easy bruising or lymphadenopathy.   -- Musculoskeletal: No acute arthralgias, acute myalgias, joint swelling, acute limitations of ROM, or acute muscular weakness.  -- Neurological: No seizures, headaches, incoordination, paraesthesias, ataxia, vertigo, or tremors.  -- Behavioral/Psych: No auditory or visual hallucinations, depression, or suicidal/homicidal ideations.  -- Endocrine: No heat or cold intolerance, polydipsia, or unintentional weight gain / loss.  -- Allergy/Immunologic: No recurrent infections or adverse reaction to food, insects, or difficulty breathing.      PAST MEDICAL / SURGICAL HISTORY:     Past Medical History:   Diagnosis Date    Alcohol abuse     Chronic bronchitis     Hepatitis C     Hypertension      History reviewed. No pertinent surgical history.      FAMILY HISTORY:     History of cardiovascular disease in the family    SOCIAL HISTORY:     Social History     Socioeconomic History    Marital status: Single     Spouse name: Not on file    Number of children: Not on file    Years of education: Not on file    Highest education level: Not on file   Occupational History    Not on file   Social Needs    Financial resource strain: Not on file    Food insecurity:     Worry: Not on file      Inability: Not on file    Transportation needs:     Medical: Not on file     Non-medical: Not on file   Tobacco Use    Smoking status: Current Every Day Smoker     Packs/day: 1.00     Types: Cigarettes    Smokeless tobacco: Never Used   Substance and Sexual Activity    Alcohol use: Not Currently     Comment: used to be a heavy drinker    Drug use: Not Currently    Sexual activity: Not on file   Lifestyle    Physical activity:     Days per week: Not on file     Minutes per session: Not on file    Stress: Not on file   Relationships    Social connections:     Talks on phone: Not on file     Gets together: Not on file     Attends Confucianism service: Not on file     Active member of club or organization: Not on file     Attends meetings of clubs or organizations: Not on file     Relationship status: Not on file   Other Topics Concern    Not on file   Social History Narrative    Not on file         ALLERGIES:       Review of patient's allergies indicates:  No Known Allergies        HOME MEDICATIONS:     Prior to Admission medications    Medication Sig Start Date End Date Taking? Authorizing Provider   albuterol-ipratropium (DUO-NEB) 2.5 mg-0.5 mg/3 mL nebulizer solution Take 3 mLs by nebulization every 4 (four) hours as needed for Wheezing. Rescue 4/9/19 4/8/20 Yes Prema Hernandez MD   guaiFENesin (MUCINEX) 600 mg 12 hr tablet Take 1 tablet (600 mg total) by mouth 2 (two) times daily. 4/9/19  Yes Prema Hernandez MD   meloxicam (MOBIC) 7.5 MG tablet Take 7.5 mg by mouth 2 (two) times daily as needed for Pain.   Yes Historical Provider, MD   albuterol (VENTOLIN HFA) 90 mcg/actuation inhaler Inhale 2 puffs into the lungs every 6 (six) hours as needed for Wheezing. Rescue    Historical Provider, MD   fluticasone-vilanterol (BREO) 100-25 mcg/dose diskus inhaler Inhale 1 puff into the lungs once daily. Controller 4/9/19   Prema Hernandez MD   nicotine (NICODERM CQ) 21 mg/24 hr Place 1 patch onto the skin once  daily. 4/10/19   Prema Hernandez MD          [facility] MEDICATIONS:     Scheduled Meds:    albuterol-ipratropium  3 mL Nebulization Q6H    dextromethorphan-guaifenesin  mg/5 ml  10 mL Oral Q6H    fluticasone furoate-vilanterol  1 puff Inhalation Daily    guaiFENesin  600 mg Oral BID    heparin (porcine)  5,000 Units Subcutaneous Q8H    methylPREDNISolone sodium succinate  125 mg Intravenous Q8H    nicotine  1 patch Transdermal Daily     Continuous Infusions:   PRN Meds: benzonatate, nitroGLYCERIN, sodium chloride 0.9%      PHYSICAL EXAM:     Wt Readings from Last 1 Encounters:   05/04/19 0509 52.4 kg (115 lb 8.3 oz)   05/03/19 2257 52.2 kg (115 lb)     Body mass index is 19.22 kg/m².  Vitals:    05/04/19 0350 05/04/19 0418 05/04/19 0431 05/04/19 0509   BP:   111/72 120/75   BP Location:    Left arm   Patient Position:    Lying   Pulse:  74 75 79   Resp:  20 17 18   Temp: 97.9 °F (36.6 °C)   97.6 °F (36.4 °C)   TempSrc: Oral   Oral   SpO2:  98% 99% 97%   Weight:    52.4 kg (115 lb 8.3 oz)   Height:              -- General appearance:  Chronically ill-appearing male.  well developed. appears stated age   -- Head: normocephalic, atraumatic   -- Eyes: conjunctivae clear. Extraocular muscles intact  -- Nose: Nares normal. Septum midline.   -- Mouth/Throat: lips, mucosa, and tongue normal. no throat erythema.   -- Neck: supple, symmetrical, trachea midline, no JVD and thyroid not grossly enlarged, appears symmetric  -- Lungs:  Decreased breath sounds auscultation bilaterally with rales, rhonchi, and diffused wheezing.  Prolonged expiratory phase and poor air excursion consistent with long-term smoker.. normal respiratory effort. No use of accessory muscles.   -- Chest wall: no tenderness. equal bilateral chest rise   -- Heart: regular rate and regular rhythm. S1, S2 normal.  no click, rub or gallop   -- Abdomen: soft, non-tender, non-distended, non-tympanic; bowel sounds normal; no masses  -- Extremities: no  cyanosis, clubbing or edema.   -- Pulses: 2+ and symmetric   -- Skin: color normal, texture normal, turgor normal. No rashes or lesions.   -- Neurologic: Normal strength and tone. No focal numbness or weakness. CNII-XII intact. [place] coma scale: eyes open spontaneously-4, oriented & converses-5, obeys commands-6.      LABORATORY STUDIES:     Recent Results (from the past 36 hour(s))   CBC auto differential    Collection Time: 05/03/19 11:05 PM   Result Value Ref Range    WBC 8.44 3.90 - 12.70 K/uL    RBC 4.50 (L) 4.60 - 6.20 M/uL    Hemoglobin 14.0 14.0 - 18.0 g/dL    Hematocrit 42.2 40.0 - 54.0 %    Mean Corpuscular Volume 94 82 - 98 fL    Mean Corpuscular Hemoglobin 31.1 (H) 27.0 - 31.0 pg    Mean Corpuscular Hemoglobin Conc 33.2 32.0 - 36.0 g/dL    RDW 13.8 11.5 - 14.5 %    Platelets 209 150 - 350 K/uL    MPV 11.7 9.2 - 12.9 fL    Gran # (ANC) 2.6 1.8 - 7.7 K/uL    Lymph # 3.3 1.0 - 4.8 K/uL    Mono # 1.0 0.3 - 1.0 K/uL    Eos # 1.4 (H) 0.0 - 0.5 K/uL    Baso # 0.17 0.00 - 0.20 K/uL    Gran% 30.3 (L) 38.0 - 73.0 %    Lymph% 39.5 18.0 - 48.0 %    Mono% 11.6 4.0 - 15.0 %    Eosinophil% 16.6 (H) 0.0 - 8.0 %    Basophil% 2.0 (H) 0.0 - 1.9 %    Differential Method Automated    Comprehensive metabolic panel    Collection Time: 05/03/19 11:05 PM   Result Value Ref Range    Sodium 145 136 - 145 mmol/L    Potassium 4.4 3.5 - 5.1 mmol/L    Chloride 110 95 - 110 mmol/L    CO2 27 23 - 29 mmol/L    Glucose 107 70 - 110 mg/dL    BUN, Bld 23 8 - 23 mg/dL    Creatinine 0.9 0.5 - 1.4 mg/dL    Calcium 9.1 8.7 - 10.5 mg/dL    Total Protein 6.8 6.0 - 8.4 g/dL    Albumin 3.9 3.5 - 5.2 g/dL    Total Bilirubin 0.4 0.1 - 1.0 mg/dL    Alkaline Phosphatase 67 55 - 135 U/L    AST 36 10 - 40 U/L    ALT 48 (H) 10 - 44 U/L    Anion Gap 8 8 - 16 mmol/L    eGFR if African American >60 >60 mL/min/1.73 m^2    eGFR if non African American >60 >60 mL/min/1.73 m^2   Troponin I #1    Collection Time: 05/03/19 11:05 PM   Result Value Ref Range     Troponin I <0.006 0.000 - 0.026 ng/mL   B-Type natriuretic peptide (BNP)    Collection Time: 05/03/19 11:05 PM   Result Value Ref Range    BNP 17 0 - 99 pg/mL   Troponin I #2    Collection Time: 05/04/19  2:55 AM   Result Value Ref Range    Troponin I <0.006 0.000 - 0.026 ng/mL   ISTAT PROCEDURE    Collection Time: 05/04/19  3:46 AM   Result Value Ref Range    POC PH 7.412 7.35 - 7.45    POC PCO2 40.9 35 - 45 mmHg    POC PO2 56 40 - 60 mmHg    POC HCO3 26.1 24 - 28 mmol/L    POC BE 1 -2 to 2 mmol/L    POC SATURATED O2 89 (L) 95 - 100 %    POC TCO2 27 24 - 29 mmol/L    Sample VENOUS     Site RR     Allens Test Pass     DelSys Room Air     Mode SPONT     Sp02 94    ISTAT PROCEDURE    Collection Time: 05/04/19  4:14 AM   Result Value Ref Range    POC PH 7.353 7.35 - 7.45    POC PCO2 41.3 35 - 45 mmHg    POC PO2 69 (L) 80 - 100 mmHg    POC HCO3 22.9 (L) 24 - 28 mmol/L    POC BE -3 -2 to 2 mmol/L    POC SATURATED O2 93 (L) 95 - 100 %    POC TCO2 24 23 - 27 mmol/L    Sample ARTERIAL     Site LR     Allens Test Pass     DelSys Room Air     Mode SPONT     Sp02 93        No results found for: INR, PROTIME  No results found for: HGBA1C  No results for input(s): POCTGLUCOSE in the last 72 hours.          IMAGING:     Imaging Results          X-Ray Chest AP Portable (Final result)  Result time 05/04/19 00:25:31    Final result by Wanda Rey MD (05/04/19 00:25:31)                 Impression:      No acute intrathoracic abnormality detected.      Electronically signed by: Wanda Rey  Date:    05/04/2019  Time:    00:25             Narrative:    EXAMINATION:  AP PORTABLE CHEST    CLINICAL HISTORY:  Chest Pain;    TECHNIQUE:  AP portable chest radiograph was submitted.    COMPARISON:  04/07/2019    FINDINGS:  AP portable chest radiograph demonstrates a cardiac silhouette within normal limits.  Vascular calcification is seen in the aortic knob.  There is no focal consolidation, pneumothorax, or pleural effusion.  There  is a stable 4 mm nodule projecting to the left of the aortic knob.  There is a nonacute fracture involving the right clavicle.  A BB is seen at the left lower chest.  There are overlying cardiac leads.                                  ASSESSMENT & PLAN:     Primary Diagnosis:  COPD with acute exacerbation    Active Hospital Problems    Diagnosis  POA    *COPD with acute exacerbation [J44.1]  Yes     Priority: 1 - High    Essential hypertension [I10]  Yes     Chronic    Tobacco abuse [Z72.0]  Yes     Chronic      Resolved Hospital Problems   No resolved problems to display.     Acute COPD exacerbation and hypoxemia  · Scheduled nebulizer treatments (albuterol/atrovent)  · Initiate Solumedrol 125mg IV q8, then taper as clinical course improves; convert to PO taper as outpatient  · PPI or H2 blocker concomitantly with steroids  · Titrate O2 sats between 88 to 93%.  No supplemental 02 for 02 saturation greater than 93% due to V/Q mismatch  · Breo, or similar, while inpatient  · Add budesonide/formoterol or salmeterol/fluticasone propionate - at or before discharge - (Advair 500/50mg, Spiriva 18mcg, or Daliresp 500mcg)   · Mucolytics  · Consult pulmonology for further optimization as clinical course dictates  · Tobacco cessation counseling    Tobacco abuse   · Chronic tobacco use  · Tobacco cessation counseling  · Nicotine patch offered; consider Wellbutrin or Chantix through PCP as an outpatient (will require closer monitoring)  · I discussed with the patient regarding the hazardous effects of smoking on increasing risk of heart attack and stroke, worsening lung functions, and increasing cancer risk.   Patient was urged to stop smoking now.  I also offered nicotine taper (such as nicotine patch and gum) to help ease the craving to smoke.      Essential Hypertension  · Goal while inpatient is a systolic blood pressure less than 160mmHg  · BP in acceptable range at this time  · Continue current home regimen with hold  parameters  · PRN antihypertensives available          VTE Risk Mitigation (From admission, onward)        Ordered     heparin (porcine) injection 5,000 Units  Every 8 hours      05/04/19 0559     IP VTE HIGH RISK PATIENT  Once      05/04/19 0500            Adult PRN medications available   DVT prophylaxis given       DISPOSITION:     Will admit to the Hospital Medicine service for further evaluation and treatment.    Chart reviewed and updated where applicable.    High Risk Conditions:  Patient has a condition that poses threat to life and bodily function: Severe Respiratory Distress      ===============================================================    Domenico Gilliam MD, MPH  Department of Hospital Medicine   [facility]  [phone] pg  (7pm - 6am)          This note is dictated using Frontleaf voice recognition software.  There are word recognition mistakes that are occasionally missed on review.

## 2019-05-04 NOTE — NURSING
RA sats    2L O2 nc at rest = 97%     2L O2 nc ambulating in hallway = 92% , coughing (dizziness with coughing)

## 2019-05-04 NOTE — HPI
Anthony Membreno is a 68 y.o. male that (in part)  has a past medical history of Alcohol abuse, Chronic bronchitis, Hepatitis C, and Hypertension.  Presents to [facility] Emergency Department complaining of severe shortness of breath as described below in detail.  He was diagnosed with COPD approximately 6 months ago.     Description of symptoms    Location:  Chest/lungs  Onset:  Subacute onset 2 days ago with progressive worsening  Character:  Severe shortness of breath and dyspnea with exertion  Frequency:  Daily frequency  Duration:  Constant duration since onset  Associated Symptoms:  Generalized fatigue and malaise.  Dyspnea with exertion.  Chest heaviness.  Denies syncope or palpitations.  Radiation:  Throughout chest  Exacerbating factors:  Worsened with exertion  Relieving factors:  Some relief with nebulizer treatments and supplemental oxygen given in the ED         In the emergency department routine laboratory studies, chest x-ray, EKG, cardiac enzymes were obtained.  There was no evidence of acute coronary syndrome, however there was concern that the patient was having hypoxemia secondary to acute COPD exacerbation and long-term tobacco abuse.  He was given nebulizer treatments and supplemental oxygen.  Was also given such as steroids.  He had some improvement in symptoms but he was unable to ambulate without significant oxygen desaturations.    Hospital medicine has been asked to admit for further evaluation and treatment.

## 2019-05-04 NOTE — ED PROVIDER NOTES
Encounter Date: 5/3/2019       History     Chief Complaint   Patient presents with    Shortness of Breath     x 1 day, hx of COPD 85% RA, tripoding with EMS, given duoneb and 125mg solumedrol with EMS, 100% on tx, + cough      68-year-old male with history of alcohol abuse, COPD, hepatitis-C, hypertension to still currently smokes presents complaining of chest heaviness and shortness of breath. He states feels like someone is sitting on him.  Started around 9:00 p.m..  He took 4 breathing treatments at home.  He was given an additional breathing treatment by EMS.  Patient states he is on 2 L of home oxygen.  States he was diagnosed with COPD 6 months ago.  Denies any known cardiac disease.        Review of patient's allergies indicates:  No Known Allergies  Past Medical History:   Diagnosis Date    Alcohol abuse     Chronic bronchitis     Hepatitis C     Hypertension      History reviewed. No pertinent surgical history.  History reviewed. No pertinent family history.  Social History     Tobacco Use    Smoking status: Current Every Day Smoker     Packs/day: 1.00     Types: Cigarettes    Smokeless tobacco: Never Used   Substance Use Topics    Alcohol use: Not Currently     Comment: used to be a heavy drinker    Drug use: Not Currently     Review of Systems   Constitutional: Negative for fever.   HENT: Negative for sore throat.    Respiratory: Positive for shortness of breath and wheezing. Negative for cough.    Cardiovascular: Positive for chest pain. Negative for palpitations and leg swelling.   Gastrointestinal: Negative for nausea.   Genitourinary: Negative for dysuria.   Musculoskeletal: Negative for back pain.   Skin: Negative for rash.   Neurological: Negative for weakness.   Hematological: Does not bruise/bleed easily.       Physical Exam     Initial Vitals   BP Pulse Resp Temp SpO2   05/03/19 2304 05/03/19 2257 05/03/19 2257 05/03/19 2257 05/03/19 2257   (!) 157/88 87 20 98.3 °F (36.8 °C) 100 %       MAP       --                Physical Exam    Nursing note and vitals reviewed.  Constitutional: He appears well-developed and well-nourished.   HENT:   Head: Atraumatic.   Eyes: EOM are normal. Pupils are equal, round, and reactive to light.   Neck: Normal range of motion. Neck supple. No JVD present.   Cardiovascular: Normal rate, regular rhythm, normal heart sounds and intact distal pulses. Exam reveals no gallop and no friction rub.    No murmur heard.  Pulmonary/Chest: Accessory muscle usage present. Tachypnea noted. He is in respiratory distress. He has wheezes. He has rhonchi.   Abdominal: Soft. Bowel sounds are normal.   Musculoskeletal: Normal range of motion.   Lymphadenopathy:     He has no cervical adenopathy.   Neurological: He is alert and oriented to person, place, and time. He has normal strength.   Skin: Skin is warm and dry.   Psychiatric: He has a normal mood and affect. Thought content normal.         ED Course   Procedures  Labs Reviewed   CBC W/ AUTO DIFFERENTIAL - Abnormal; Notable for the following components:       Result Value    RBC 4.50 (*)     Mean Corpuscular Hemoglobin 31.1 (*)     Eos # 1.4 (*)     Gran% 30.3 (*)     Eosinophil% 16.6 (*)     Basophil% 2.0 (*)     All other components within normal limits   COMPREHENSIVE METABOLIC PANEL - Abnormal; Notable for the following components:    ALT 48 (*)     All other components within normal limits   ISTAT PROCEDURE - Abnormal; Notable for the following components:    POC SATURATED O2 89 (*)     All other components within normal limits   ISTAT PROCEDURE - Abnormal; Notable for the following components:    POC PO2 69 (*)     POC HCO3 22.9 (*)     POC SATURATED O2 93 (*)     All other components within normal limits   TROPONIN I   B-TYPE NATRIURETIC PEPTIDE   TROPONIN I     EKG Readings: (Independently Interpreted)   Initial Reading: No STEMI. Rhythm: Normal Sinus Rhythm. Heart Rate: 90. Ectopy: No Ectopy. Conduction: Normal.   Old septal  infarct, no changes from prior EKG on April 6, 2019.       Imaging Results          X-Ray Chest AP Portable (Final result)  Result time 05/04/19 00:25:31    Final result by Wanda Rey MD (05/04/19 00:25:31)                 Impression:      No acute intrathoracic abnormality detected.      Electronically signed by: Wanda Rey  Date:    05/04/2019  Time:    00:25             Narrative:    EXAMINATION:  AP PORTABLE CHEST    CLINICAL HISTORY:  Chest Pain;    TECHNIQUE:  AP portable chest radiograph was submitted.    COMPARISON:  04/07/2019    FINDINGS:  AP portable chest radiograph demonstrates a cardiac silhouette within normal limits.  Vascular calcification is seen in the aortic knob.  There is no focal consolidation, pneumothorax, or pleural effusion.  There is a stable 4 mm nodule projecting to the left of the aortic knob.  There is a nonacute fracture involving the right clavicle.  A BB is seen at the left lower chest.  There are overlying cardiac leads.                              X-Rays:   Independently Interpreted Readings:   Chest X-Ray: Normal heart size.  No infiltrates.  No acute abnormalities.       patient has received 4 nebulizer treatments at home.  One by EMS.  An additional 17.5 mg of albuterol here in the emergency room.  Is now 4 hr after steroids.  Patient is still having inspiratory and expiratory wheezes.  States he does not feel at his baseline.  No increased oxygen demand.  However his peak flow at best was 130.  This is well below his predicted value.  Will place in observation for continuation of steroids and breathing treatments.  I feel the patient's chest pain was COPD related.  However will trend troponins to complete rule out.                    Clinical Impression:       ICD-10-CM ICD-9-CM   1. Chronic obstructive pulmonary disease, unspecified COPD type J44.9 496   2. Chest pain R07.9 786.50                                Pa Montoya MD  05/04/19 0424

## 2019-05-05 NOTE — NURSING
Discharge instructions printed and explained to patient. Patient instructed to ensure follow-up appointment is made ASAP. New medications, side effects, and uses explained. Questions encouraged and answered. Instructions complete and patient verbalized understanding. Patient in NAD and no complaints of pain at this time. Patient awaiting transport oxygen from family. Patient instructed to inform the nurse when family arrives so he can be wheeled down. Patient stable and will continue to be monitored.

## 2019-05-05 NOTE — NURSING
Report received from PABLO Valente. Katie Guallpa NP at bedside with prescriptions for patient. Patient informed that discharge instructions will be given.

## 2019-05-05 NOTE — DISCHARGE SUMMARY
[facility] Hospital Medicine  Discharge Summary      Patient Name: Anthony Membreno  MRN: [id number]  Admission Date: 5/3/2019  Hospital Length of Stay: 0 days  Discharge Date and Time:  05/04/2019 7:02 PM  Attending Physician: Kathryn Hernandez MD   Discharging Provider: Katie Guallpa NP  Primary Care Provider: [facility]      HPI:   Anthony Membreno is a 68 y.o. male that (in part)  has a past medical history of Alcohol abuse, Chronic bronchitis, Hepatitis C, and Hypertension.  Presents to [facility] Emergency Department complaining of severe shortness of breath as described below in detail.  He was diagnosed with COPD approximately 6 months ago.     Description of symptoms    Location:  Chest/lungs  Onset:  Subacute onset 2 days ago with progressive worsening  Character:  Severe shortness of breath and dyspnea with exertion  Frequency:  Daily frequency  Duration:  Constant duration since onset  Associated Symptoms:  Generalized fatigue and malaise.  Dyspnea with exertion.  Chest heaviness.  Denies syncope or palpitations.  Radiation:  Throughout chest  Exacerbating factors:  Worsened with exertion  Relieving factors:  Some relief with nebulizer treatments and supplemental oxygen given in the ED         In the emergency department routine laboratory studies, chest x-ray, EKG, cardiac enzymes were obtained.  There was no evidence of acute coronary syndrome, however there was concern that the patient was having hypoxemia secondary to acute COPD exacerbation and long-term tobacco abuse.  He was given nebulizer treatments and supplemental oxygen.  Was also given such as steroids.  He had some improvement in symptoms but he was unable to ambulate without significant oxygen desaturations.    Hospital medicine has been asked to admit for further evaluation and treatment.     * No surgery found *      Hospital Course:   Anthony Membreno 68 y.o. male admitted to  observation for COPD exacerbation. Increased work of breathing in the ED despite albuterol. Chest x-ray without abnormality, and EKG of NSR.  Patient reports started on Breo 2 weeks ago but had sore throat so discontinued.  Started on duo-nebs and steroids with continue improvement throughout day.  Patient reassessed again this 5:30 p.m., lungs diminished with no wheezing.  Patient ambulated in the hallway and O2 saturation 95% on home 2 L. patient reports breathing close to baseline and wants to go home.  Patient discharged home on prednisone 40 mg x5 days.  Encourage smoking cessation.  Patient eager to stop.  Rx for nicotine patch.  Patient will follow up with PCP at [facility] in 1 week.  Encourage patient compliance with DuoNeb 4 times a day and Breo.  Patient stable for discharge. See discharge medication list below.     Consults:       Final Active Diagnoses:    Diagnosis Date Noted POA    PRINCIPAL PROBLEM:  COPD with acute exacerbation [J44.1] 04/07/2019 Yes    Essential hypertension [I10] 04/07/2019 Yes     Chronic    Tobacco abuse [Z72.0] 04/07/2019 Yes     Chronic      Problems Resolved During this Admission:       Discharged Condition: good    Disposition: Home or Self Care    Follow Up:  Follow-up Information     [facility]. Call in 1 day.    Why:  out patient services  Contact information:  [address] [id number] [phone]                 Patient Instructions:      Notify your health care provider if you experience any of the following:  temperature >100.4     Notify your health care provider if you experience any of the following:  difficulty breathing or increased cough     Notify your health care provider if you experience any of the following:  increased confusion or weakness     Activity as tolerated       Significant Diagnostic Studies: Labs:   BMP:   Recent Labs   Lab 05/03/19  2305         K 4.4      CO2 27   BUN 23   CREATININE 0.9   CALCIUM 9.1    , CMP   Recent Labs   Lab 05/03/19  2305      K 4.4      CO2 27      BUN 23   CREATININE 0.9   CALCIUM 9.1   PROT 6.8   ALBUMIN 3.9   BILITOT 0.4   ALKPHOS 67   AST 36   ALT 48*   ANIONGAP 8   ESTGFRAFRICA >60   EGFRNONAA >60   , CBC   Recent Labs   Lab 05/03/19  2305   WBC 8.44   HGB 14.0   HCT 42.2      , INR No results found for: INR, PROTIME, Lipid Panel No results found for: CHOL, HDL, LDLCALC, TRIG, CHOLHDL, Troponin   Recent Labs   Lab 05/04/19  1129   TROPONINI <0.006    and A1C: No results for input(s): HGBA1C in the last 4320 hours.    Pending Diagnostic Studies:     None         Medications:  Reconciled Home Medications:      Medication List      START taking these medications    predniSONE 20 MG tablet  Commonly known as:  DELTASONE  Take 2 tablets (40 mg total) by mouth once daily. for 5 days        CHANGE how you take these medications    * nicotine 21 mg/24 hr  Commonly known as:  NICODERM CQ  Place 1 patch onto the skin once daily.  What changed:  Another medication with the same name was added. Make sure you understand how and when to take each.     * nicotine 21 mg/24 hr  Commonly known as:  NICODERM CQ  Place 1 patch onto the skin once daily.  What changed:  You were already taking a medication with the same name, and this prescription was added. Make sure you understand how and when to take each.         * This list has 2 medication(s) that are the same as other medications prescribed for you. Read the directions carefully, and ask your doctor or other care provider to review them with you.            CONTINUE taking these medications    albuterol-ipratropium 2.5 mg-0.5 mg/3 mL nebulizer solution  Commonly known as:  DUO-NEB  Take 3 mLs by nebulization every 4 (four) hours as needed for Wheezing. Rescue     fluticasone furoate-vilanterol 100-25 mcg/dose diskus inhaler  Commonly known as:  BREO  Inhale 1 puff into the lungs once daily. Controller     guaiFENesin 600 mg  12 hr tablet  Commonly known as:  MUCINEX  Take 1 tablet (600 mg total) by mouth 2 (two) times daily.     VENTOLIN HFA 90 mcg/actuation inhaler  Generic drug:  albuterol  Inhale 2 puffs into the lungs every 6 (six) hours as needed for Wheezing. Rescue        STOP taking these medications    meloxicam 7.5 MG tablet  Commonly known as:  MOBIC            Indwelling Lines/Drains at time of discharge:   Lines/Drains/Airways          None          Time spent on the discharge of patient: 35 minutes  Patient was seen and examined on the date of discharge and determined to be suitable for discharge.         Katie Guallpa NP  Department of Hospital Medicine  [facility]

## 2019-05-22 ENCOUNTER — HOSPITAL ENCOUNTER (EMERGENCY)
Facility: HOSPITAL | Age: 69
Discharge: HOME | End: 2019-05-22
Attending: EMERGENCY MEDICINE
Payer: MEDICARE

## 2019-05-22 VITALS
OXYGEN SATURATION: 98 % | DIASTOLIC BLOOD PRESSURE: 69 MMHG | BODY MASS INDEX: 18.64 KG/M2 | TEMPERATURE: 98 F | RESPIRATION RATE: 13 BRPM | WEIGHT: 116 LBS | HEIGHT: 66 IN | HEART RATE: 72 BPM | SYSTOLIC BLOOD PRESSURE: 109 MMHG

## 2019-05-22 DIAGNOSIS — E86.0 DEHYDRATION: Primary | ICD-10-CM

## 2019-05-22 DIAGNOSIS — R42 DIZZINESS: ICD-10-CM

## 2019-05-22 LAB
ALBUMIN SERPL BCP-MCNC: 3.2 G/DL (ref 3.5–5.2)
ALP SERPL-CCNC: 50 U/L (ref 55–135)
ALT SERPL W/O P-5'-P-CCNC: 27 U/L (ref 10–44)
ANION GAP SERPL CALC-SCNC: 6 MMOL/L (ref 8–16)
ANION GAP SERPL CALC-SCNC: 7 MMOL/L (ref 8–16)
AST SERPL-CCNC: 24 U/L (ref 10–40)
BASOPHILS # BLD AUTO: 0.03 K/UL (ref 0–0.2)
BASOPHILS NFR BLD: 0.4 % (ref 0–1.9)
BILIRUB SERPL-MCNC: 0.6 MG/DL (ref 0.1–1)
BUN SERPL-MCNC: 33 MG/DL (ref 8–23)
BUN SERPL-MCNC: 36 MG/DL (ref 8–23)
CALCIUM SERPL-MCNC: 7.8 MG/DL (ref 8.7–10.5)
CALCIUM SERPL-MCNC: 8.3 MG/DL (ref 8.7–10.5)
CHLORIDE SERPL-SCNC: 108 MMOL/L (ref 95–110)
CHLORIDE SERPL-SCNC: 110 MMOL/L (ref 95–110)
CO2 SERPL-SCNC: 22 MMOL/L (ref 23–29)
CO2 SERPL-SCNC: 22 MMOL/L (ref 23–29)
CREAT SERPL-MCNC: 1.8 MG/DL (ref 0.5–1.4)
CREAT SERPL-MCNC: 2.1 MG/DL (ref 0.5–1.4)
DIFFERENTIAL METHOD: ABNORMAL
EOSINOPHIL # BLD AUTO: 0.1 K/UL (ref 0–0.5)
EOSINOPHIL NFR BLD: 0.7 % (ref 0–8)
ERYTHROCYTE [DISTWIDTH] IN BLOOD BY AUTOMATED COUNT: 13.8 % (ref 11.5–14.5)
EST. GFR  (AFRICAN AMERICAN): 37 ML/MIN/1.73 M^2
EST. GFR  (AFRICAN AMERICAN): 44 ML/MIN/1.73 M^2
EST. GFR  (NON AFRICAN AMERICAN): 32 ML/MIN/1.73 M^2
EST. GFR  (NON AFRICAN AMERICAN): 38 ML/MIN/1.73 M^2
ETHANOL SERPL-MCNC: <10 MG/DL
GLUCOSE SERPL-MCNC: 101 MG/DL (ref 70–110)
GLUCOSE SERPL-MCNC: 154 MG/DL (ref 70–110)
HCT VFR BLD AUTO: 36.8 % (ref 40–54)
HGB BLD-MCNC: 12.4 G/DL (ref 14–18)
LIPASE SERPL-CCNC: 11 U/L (ref 4–60)
LYMPHOCYTES # BLD AUTO: 1.3 K/UL (ref 1–4.8)
LYMPHOCYTES NFR BLD: 15.7 % (ref 18–48)
MAGNESIUM SERPL-MCNC: 1.5 MG/DL (ref 1.6–2.6)
MCH RBC QN AUTO: 31.9 PG (ref 27–31)
MCHC RBC AUTO-ENTMCNC: 33.7 G/DL (ref 32–36)
MCV RBC AUTO: 95 FL (ref 82–98)
MONOCYTES # BLD AUTO: 0.4 K/UL (ref 0.3–1)
MONOCYTES NFR BLD: 5 % (ref 4–15)
NEUTROPHILS # BLD AUTO: 6.4 K/UL (ref 1.8–7.7)
NEUTROPHILS NFR BLD: 78.2 % (ref 38–73)
PLATELET # BLD AUTO: 156 K/UL (ref 150–350)
PMV BLD AUTO: 12.1 FL (ref 9.2–12.9)
POTASSIUM SERPL-SCNC: 5.4 MMOL/L (ref 3.5–5.1)
POTASSIUM SERPL-SCNC: 5.8 MMOL/L (ref 3.5–5.1)
PROT SERPL-MCNC: 5.7 G/DL (ref 6–8.4)
RBC # BLD AUTO: 3.89 M/UL (ref 4.6–6.2)
SODIUM SERPL-SCNC: 136 MMOL/L (ref 136–145)
SODIUM SERPL-SCNC: 139 MMOL/L (ref 136–145)
TROPONIN I SERPL DL<=0.01 NG/ML-MCNC: 0.01 NG/ML (ref 0–0.03)
WBC # BLD AUTO: 8.16 K/UL (ref 3.9–12.7)

## 2019-05-22 PROCEDURE — 96361 HYDRATE IV INFUSION ADD-ON: CPT

## 2019-05-22 PROCEDURE — 83735 ASSAY OF MAGNESIUM: CPT

## 2019-05-22 PROCEDURE — 93010 EKG 12-LEAD: ICD-10-PCS | Mod: ,,, | Performed by: INTERNAL MEDICINE

## 2019-05-22 PROCEDURE — 99284 EMERGENCY DEPT VISIT MOD MDM: CPT | Mod: 25

## 2019-05-22 PROCEDURE — 25000003 PHARM REV CODE 250: Performed by: EMERGENCY MEDICINE

## 2019-05-22 PROCEDURE — 83690 ASSAY OF LIPASE: CPT

## 2019-05-22 PROCEDURE — 93010 ELECTROCARDIOGRAM REPORT: CPT | Mod: ,,, | Performed by: INTERNAL MEDICINE

## 2019-05-22 PROCEDURE — 80320 DRUG SCREEN QUANTALCOHOLS: CPT

## 2019-05-22 PROCEDURE — 85025 COMPLETE CBC W/AUTO DIFF WBC: CPT

## 2019-05-22 PROCEDURE — 80048 BASIC METABOLIC PNL TOTAL CA: CPT

## 2019-05-22 PROCEDURE — 96360 HYDRATION IV INFUSION INIT: CPT

## 2019-05-22 PROCEDURE — 84484 ASSAY OF TROPONIN QUANT: CPT

## 2019-05-22 PROCEDURE — 80053 COMPREHEN METABOLIC PANEL: CPT

## 2019-05-22 PROCEDURE — 93005 ELECTROCARDIOGRAM TRACING: CPT

## 2019-05-22 RX ADMIN — SODIUM CHLORIDE 1000 ML: 0.9 INJECTION, SOLUTION INTRAVENOUS at 08:05

## 2019-05-22 RX ADMIN — SODIUM CHLORIDE 1000 ML: 0.9 INJECTION, SOLUTION INTRAVENOUS at 07:05

## 2019-05-22 NOTE — ED PROVIDER NOTES
"Encounter Date: 5/22/2019    SCRIBE #1 NOTE: I, Cecilio Todd, am scribing for, and in the presence of,  Dashawn Mcadams MD. I have scribed the following portions of the note - Other sections scribed: HPI, ROS and PE.       History     Chief Complaint   Patient presents with    Weakness     +reports LOC yesterday, weakness x 2 days, rec'd 2L NS with medic. NAD.      CC: Weakness    HPI: Patient is a 68-year-old male PMHx HTN, hepatitis C, EtOH abuse, chronic bronchitis who presents with generalized weakness that began yesterday. Patient states "my blood pressure is low." Last blood pressure read was 76/50s. He denies starting any new medications. No known medical allergies. EMS reports patient received 2L NS en route. Patient states he feels better since presenting to the emergency department.         Review of patient's allergies indicates:  No Known Allergies  Past Medical History:   Diagnosis Date    Alcohol abuse     Chronic bronchitis     Hepatitis C     Hypertension      No past surgical history on file.  No family history on file.  Social History     Tobacco Use    Smoking status: Current Every Day Smoker     Packs/day: 1.00     Types: Cigarettes    Smokeless tobacco: Never Used   Substance Use Topics    Alcohol use: Not Currently     Comment: used to be a heavy drinker    Drug use: Not Currently     Review of Systems   Respiratory: Negative for cough and shortness of breath.    Cardiovascular: Negative for chest pain.   Gastrointestinal: Negative for abdominal pain, diarrhea, nausea and vomiting.   Genitourinary: Negative for dysuria.   Neurological: Positive for weakness (generalized). Negative for headaches.   All other systems reviewed and are negative.      Physical Exam     Initial Vitals [05/22/19 1846]   BP Pulse Resp Temp SpO2   93/61 72 18 96.7 °F (35.9 °C) 97 %      MAP       --         Physical Exam    ED Course   Procedures  Labs Reviewed   CBC W/ AUTO DIFFERENTIAL - Abnormal; " Notable for the following components:       Result Value    RBC 3.89 (*)     Hemoglobin 12.4 (*)     Hematocrit 36.8 (*)     Mean Corpuscular Hemoglobin 31.9 (*)     Gran% 78.2 (*)     Lymph% 15.7 (*)     All other components within normal limits   COMPREHENSIVE METABOLIC PANEL - Abnormal; Notable for the following components:    Potassium 5.8 (*)     CO2 22 (*)     BUN, Bld 36 (*)     Creatinine 2.1 (*)     Calcium 8.3 (*)     Total Protein 5.7 (*)     Albumin 3.2 (*)     Alkaline Phosphatase 50 (*)     Anion Gap 6 (*)     eGFR if  37 (*)     eGFR if non  32 (*)     All other components within normal limits   MAGNESIUM - Abnormal; Notable for the following components:    Magnesium 1.5 (*)     All other components within normal limits   BASIC METABOLIC PANEL - Abnormal; Notable for the following components:    Potassium 5.4 (*)     CO2 22 (*)     Glucose 154 (*)     BUN, Bld 33 (*)     Creatinine 1.8 (*)     Calcium 7.8 (*)     Anion Gap 7 (*)     eGFR if  44 (*)     eGFR if non  38 (*)     All other components within normal limits   LIPASE   TROPONIN I   ALCOHOL,MEDICAL (ETHANOL)     EKG Readings: (Independently Interpreted)   Initial Reading: No STEMI.   Normal sinus rhythm rate of 69 normal QRS complex normal QT normal intervals nonspecific ST changes no evidence of ST segment elevation MI normal axis       Imaging Results    None                     Scribe Attestation:   Scribe #1: I performed the above scribed service and the documentation accurately describes the services I performed. I attest to the accuracy of the note.    Attending Attestation:           Physician Attestation for Scribe:  Physician Attestation Statement for Scribe #1: I, Dashawn Mcadams MD, reviewed documentation, as scribed by Cecilio Todd in my presence, and it is both accurate and complete.         Attending ED Notes:   I, Dr. Dashawn Mcnally, personally performed  the services described in this documentation. All medical record entries made by the scribe were at my direction and in my presence.  I have reviewed the chart and agree that the record reflects my personal performance and is accurate and complete. Dashawn Mcnally MD.  7:37 PM 05/22/2019  Patient much improved given IV fluids patient with a some acute kidney injury secondary to dehydration after given 2 L of IV fluids creatinine improved from 2.1-1.8 slightly elevated potassium although asymptomatic have encouraged the patient to continue aggressive fluid intake avoid any medications that include potassium follow-up with primary care physician               Clinical Impression:       ICD-10-CM ICD-9-CM   1. Dehydration E86.0 276.51   2. Dizziness R42 780.4         Disposition:   Disposition: Discharged  Condition: Stable                        Dashawn Mcadams MD  05/22/19 [id number]

## 2019-05-23 NOTE — ED NOTES
Patient has a 500cc bag of NS 0.9% hanging that was administered by EMS pta along with the ordered NS bolus administered primary nurse.

## 2024-08-30 NOTE — ASSESSMENT & PLAN NOTE
Patient was noted to have an ambient air oxygen saturation of 80% in the field.  He likely has undiagnosed COPD given his extensive tobacco history.  He required NIPPV with BPAP upon arrival to the ED.  CXR was significant for hyperexpanded lung fields but without any infiltrates, consolidations, pleural effusions, nor any pneumothorax.    He did have some relief with nebulized treatments in the ED.  Will provide frequent nebulizations with CHRIS/LAMA; intravenous corticosteroids; and empiric antibiotic therapy.  Improving, but not at baseline.  Probably one more day of treatment and possibly home tomorrow.   baseline cough/problems swallowing secretions